# Patient Record
Sex: MALE | Race: WHITE | NOT HISPANIC OR LATINO
[De-identification: names, ages, dates, MRNs, and addresses within clinical notes are randomized per-mention and may not be internally consistent; named-entity substitution may affect disease eponyms.]

---

## 2017-09-27 PROBLEM — Z00.00 ENCOUNTER FOR PREVENTIVE HEALTH EXAMINATION: Status: ACTIVE | Noted: 2017-09-27

## 2017-10-14 PROBLEM — M25.569 KNEE PAIN: Status: ACTIVE | Noted: 2017-10-14

## 2017-10-18 ENCOUNTER — APPOINTMENT (OUTPATIENT)
Dept: ORTHOPEDIC SURGERY | Facility: CLINIC | Age: 47
End: 2017-10-18
Payer: COMMERCIAL

## 2017-10-18 VITALS
WEIGHT: 220 LBS | DIASTOLIC BLOOD PRESSURE: 89 MMHG | HEIGHT: 70 IN | TEMPERATURE: 98.1 F | BODY MASS INDEX: 31.5 KG/M2 | SYSTOLIC BLOOD PRESSURE: 135 MMHG | HEART RATE: 79 BPM

## 2017-10-18 DIAGNOSIS — M25.561 PAIN IN RIGHT KNEE: ICD-10-CM

## 2017-10-18 DIAGNOSIS — M25.569 PAIN IN UNSPECIFIED KNEE: ICD-10-CM

## 2017-10-18 DIAGNOSIS — Z86.79 PERSONAL HISTORY OF OTHER DISEASES OF THE CIRCULATORY SYSTEM: ICD-10-CM

## 2017-10-18 PROCEDURE — 73562 X-RAY EXAM OF KNEE 3: CPT | Mod: RT

## 2017-10-18 PROCEDURE — 99204 OFFICE O/P NEW MOD 45 MIN: CPT

## 2017-10-18 RX ORDER — NIFEDIPINE 10 MG/1
CAPSULE ORAL
Refills: 0 | Status: ACTIVE | COMMUNITY

## 2017-10-18 RX ORDER — ATENOLOL 50 MG/1
50 TABLET ORAL
Refills: 0 | Status: ACTIVE | COMMUNITY

## 2017-10-18 RX ORDER — LISINOPRIL 30 MG/1
TABLET ORAL
Refills: 0 | Status: ACTIVE | COMMUNITY

## 2017-10-18 RX ORDER — DOXAZOSIN 1 MG/1
1 TABLET ORAL
Refills: 0 | Status: ACTIVE | COMMUNITY

## 2017-11-27 ENCOUNTER — OUTPATIENT (OUTPATIENT)
Dept: OUTPATIENT SERVICES | Facility: HOSPITAL | Age: 47
LOS: 1 days | End: 2017-11-27
Payer: COMMERCIAL

## 2017-11-27 VITALS
RESPIRATION RATE: 16 BRPM | HEIGHT: 70 IN | WEIGHT: 244.49 LBS | TEMPERATURE: 99 F | DIASTOLIC BLOOD PRESSURE: 80 MMHG | HEART RATE: 75 BPM | SYSTOLIC BLOOD PRESSURE: 120 MMHG

## 2017-11-27 DIAGNOSIS — Z01.818 ENCOUNTER FOR OTHER PREPROCEDURAL EXAMINATION: ICD-10-CM

## 2017-11-27 DIAGNOSIS — M17.11 UNILATERAL PRIMARY OSTEOARTHRITIS, RIGHT KNEE: ICD-10-CM

## 2017-11-27 DIAGNOSIS — I10 ESSENTIAL (PRIMARY) HYPERTENSION: ICD-10-CM

## 2017-11-27 DIAGNOSIS — Z98.890 OTHER SPECIFIED POSTPROCEDURAL STATES: Chronic | ICD-10-CM

## 2017-11-27 LAB
ANION GAP SERPL CALC-SCNC: 14 MMOL/L — SIGNIFICANT CHANGE UP (ref 5–17)
APTT BLD: 29.5 SEC — SIGNIFICANT CHANGE UP (ref 27.5–37.4)
BASOPHILS # BLD AUTO: 0.1 K/UL — SIGNIFICANT CHANGE UP (ref 0–0.2)
BASOPHILS NFR BLD AUTO: 1 % — SIGNIFICANT CHANGE UP (ref 0–2)
BLD GP AB SCN SERPL QL: SIGNIFICANT CHANGE UP
BUN SERPL-MCNC: 15 MG/DL — SIGNIFICANT CHANGE UP (ref 8–20)
CALCIUM SERPL-MCNC: 9.4 MG/DL — SIGNIFICANT CHANGE UP (ref 8.6–10.2)
CHLORIDE SERPL-SCNC: 103 MMOL/L — SIGNIFICANT CHANGE UP (ref 98–107)
CO2 SERPL-SCNC: 28 MMOL/L — SIGNIFICANT CHANGE UP (ref 22–29)
CREAT SERPL-MCNC: 0.99 MG/DL — SIGNIFICANT CHANGE UP (ref 0.5–1.3)
EOSINOPHIL # BLD AUTO: 0.2 K/UL — SIGNIFICANT CHANGE UP (ref 0–0.5)
EOSINOPHIL NFR BLD AUTO: 2.7 % — SIGNIFICANT CHANGE UP (ref 0–5)
GLUCOSE SERPL-MCNC: 118 MG/DL — HIGH (ref 70–115)
HCT VFR BLD CALC: 45.9 % — SIGNIFICANT CHANGE UP (ref 42–52)
HGB BLD-MCNC: 15.7 G/DL — SIGNIFICANT CHANGE UP (ref 14–18)
INR BLD: 0.98 RATIO — SIGNIFICANT CHANGE UP (ref 0.88–1.16)
LYMPHOCYTES # BLD AUTO: 1.2 K/UL — SIGNIFICANT CHANGE UP (ref 1–4.8)
LYMPHOCYTES # BLD AUTO: 19.2 % — LOW (ref 20–55)
MCHC RBC-ENTMCNC: 31 PG — SIGNIFICANT CHANGE UP (ref 27–31)
MCHC RBC-ENTMCNC: 34.2 G/DL — SIGNIFICANT CHANGE UP (ref 32–36)
MCV RBC AUTO: 90.7 FL — SIGNIFICANT CHANGE UP (ref 80–94)
MONOCYTES # BLD AUTO: 0.5 K/UL — SIGNIFICANT CHANGE UP (ref 0–0.8)
MONOCYTES NFR BLD AUTO: 8.6 % — SIGNIFICANT CHANGE UP (ref 3–10)
MRSA PCR RESULT.: SIGNIFICANT CHANGE UP
NEUTROPHILS # BLD AUTO: 4.3 K/UL — SIGNIFICANT CHANGE UP (ref 1.8–8)
NEUTROPHILS NFR BLD AUTO: 68 % — SIGNIFICANT CHANGE UP (ref 37–73)
PLATELET # BLD AUTO: 220 K/UL — SIGNIFICANT CHANGE UP (ref 150–400)
POTASSIUM SERPL-MCNC: 4.4 MMOL/L — SIGNIFICANT CHANGE UP (ref 3.5–5.3)
POTASSIUM SERPL-SCNC: 4.4 MMOL/L — SIGNIFICANT CHANGE UP (ref 3.5–5.3)
PROTHROM AB SERPL-ACNC: 10.8 SEC — SIGNIFICANT CHANGE UP (ref 9.8–12.7)
RBC # BLD: 5.06 M/UL — SIGNIFICANT CHANGE UP (ref 4.6–6.2)
RBC # FLD: 13.3 % — SIGNIFICANT CHANGE UP (ref 11–15.6)
S AUREUS DNA NOSE QL NAA+PROBE: SIGNIFICANT CHANGE UP
SODIUM SERPL-SCNC: 145 MMOL/L — SIGNIFICANT CHANGE UP (ref 135–145)
TYPE + AB SCN PNL BLD: SIGNIFICANT CHANGE UP
WBC # BLD: 6.3 K/UL — SIGNIFICANT CHANGE UP (ref 4.8–10.8)
WBC # FLD AUTO: 6.3 K/UL — SIGNIFICANT CHANGE UP (ref 4.8–10.8)

## 2017-11-27 PROCEDURE — 85027 COMPLETE CBC AUTOMATED: CPT

## 2017-11-27 PROCEDURE — 85610 PROTHROMBIN TIME: CPT

## 2017-11-27 PROCEDURE — 86850 RBC ANTIBODY SCREEN: CPT

## 2017-11-27 PROCEDURE — 86901 BLOOD TYPING SEROLOGIC RH(D): CPT

## 2017-11-27 PROCEDURE — 87641 MR-STAPH DNA AMP PROBE: CPT

## 2017-11-27 PROCEDURE — 87640 STAPH A DNA AMP PROBE: CPT

## 2017-11-27 PROCEDURE — G0463: CPT

## 2017-11-27 PROCEDURE — 93005 ELECTROCARDIOGRAM TRACING: CPT

## 2017-11-27 PROCEDURE — 85730 THROMBOPLASTIN TIME PARTIAL: CPT

## 2017-11-27 PROCEDURE — 36415 COLL VENOUS BLD VENIPUNCTURE: CPT

## 2017-11-27 PROCEDURE — 93010 ELECTROCARDIOGRAM REPORT: CPT

## 2017-11-27 PROCEDURE — 80048 BASIC METABOLIC PNL TOTAL CA: CPT

## 2017-11-27 PROCEDURE — 86900 BLOOD TYPING SEROLOGIC ABO: CPT

## 2017-11-27 NOTE — H&P PST ADULT - NSANTHOSAYNRD_GEN_A_CORE
No. JOSE L screening performed.  STOP BANG Legend: 0-2 = LOW Risk; 3-4 = INTERMEDIATE Risk; 5-8 = HIGH Risk

## 2017-11-27 NOTE — H&P PST ADULT - HISTORY OF PRESENT ILLNESS
47yom who states that he has right knee pain for the last few years which progressively worsened, had steroid injections to the knee multiple times which is no more helping

## 2017-12-09 RX ORDER — OXYCODONE HYDROCHLORIDE 5 MG/1
20 TABLET ORAL ONCE
Qty: 0 | Refills: 0 | Status: DISCONTINUED | OUTPATIENT
Start: 2017-12-18 | End: 2017-12-18

## 2017-12-17 ENCOUNTER — FORM ENCOUNTER (OUTPATIENT)
Age: 47
End: 2017-12-17

## 2017-12-18 ENCOUNTER — APPOINTMENT (OUTPATIENT)
Dept: ORTHOPEDIC SURGERY | Facility: HOSPITAL | Age: 47
End: 2017-12-18

## 2017-12-18 ENCOUNTER — INPATIENT (INPATIENT)
Facility: HOSPITAL | Age: 47
LOS: 1 days | Discharge: HOME | DRG: 470 | End: 2017-12-20
Attending: ORTHOPAEDIC SURGERY | Admitting: ORTHOPAEDIC SURGERY
Payer: COMMERCIAL

## 2017-12-18 ENCOUNTER — TRANSCRIPTION ENCOUNTER (OUTPATIENT)
Age: 47
End: 2017-12-18

## 2017-12-18 ENCOUNTER — RESULT REVIEW (OUTPATIENT)
Age: 47
End: 2017-12-18

## 2017-12-18 VITALS
WEIGHT: 242.51 LBS | TEMPERATURE: 99 F | HEART RATE: 68 BPM | RESPIRATION RATE: 16 BRPM | OXYGEN SATURATION: 99 % | DIASTOLIC BLOOD PRESSURE: 76 MMHG | HEIGHT: 70 IN | SYSTOLIC BLOOD PRESSURE: 126 MMHG

## 2017-12-18 DIAGNOSIS — M17.11 UNILATERAL PRIMARY OSTEOARTHRITIS, RIGHT KNEE: ICD-10-CM

## 2017-12-18 DIAGNOSIS — Z29.9 ENCOUNTER FOR PROPHYLACTIC MEASURES, UNSPECIFIED: ICD-10-CM

## 2017-12-18 DIAGNOSIS — Z98.890 OTHER SPECIFIED POSTPROCEDURAL STATES: Chronic | ICD-10-CM

## 2017-12-18 DIAGNOSIS — Z00.00 ENCOUNTER FOR GENERAL ADULT MEDICAL EXAMINATION WITHOUT ABNORMAL FINDINGS: ICD-10-CM

## 2017-12-18 DIAGNOSIS — I10 ESSENTIAL (PRIMARY) HYPERTENSION: ICD-10-CM

## 2017-12-18 DIAGNOSIS — Z96.651 PRESENCE OF RIGHT ARTIFICIAL KNEE JOINT: ICD-10-CM

## 2017-12-18 PROCEDURE — 88311 DECALCIFY TISSUE: CPT | Mod: 26

## 2017-12-18 PROCEDURE — 20985 CPTR-ASST DIR MS PX: CPT

## 2017-12-18 PROCEDURE — 73560 X-RAY EXAM OF KNEE 1 OR 2: CPT | Mod: 26,RT

## 2017-12-18 PROCEDURE — 99253 IP/OBS CNSLTJ NEW/EST LOW 45: CPT

## 2017-12-18 PROCEDURE — 88305 TISSUE EXAM BY PATHOLOGIST: CPT | Mod: 26

## 2017-12-18 PROCEDURE — 27447 TOTAL KNEE ARTHROPLASTY: CPT | Mod: RT

## 2017-12-18 PROCEDURE — 27447 TOTAL KNEE ARTHROPLASTY: CPT | Mod: AS,RT

## 2017-12-18 RX ORDER — SODIUM CHLORIDE 9 MG/ML
1000 INJECTION, SOLUTION INTRAVENOUS
Qty: 0 | Refills: 0 | Status: DISCONTINUED | OUTPATIENT
Start: 2017-12-18 | End: 2017-12-18

## 2017-12-18 RX ORDER — VANCOMYCIN HCL 1 G
1750 VIAL (EA) INTRAVENOUS
Qty: 0 | Refills: 0 | Status: COMPLETED | OUTPATIENT
Start: 2017-12-18 | End: 2017-12-18

## 2017-12-18 RX ORDER — CELECOXIB 200 MG/1
400 CAPSULE ORAL ONCE
Qty: 0 | Refills: 0 | Status: COMPLETED | OUTPATIENT
Start: 2017-12-18 | End: 2017-12-18

## 2017-12-18 RX ORDER — SODIUM CHLORIDE 9 MG/ML
1000 INJECTION, SOLUTION INTRAVENOUS
Qty: 0 | Refills: 0 | Status: DISCONTINUED | OUTPATIENT
Start: 2017-12-18 | End: 2017-12-19

## 2017-12-18 RX ORDER — ATENOLOL 25 MG/1
100 TABLET ORAL DAILY
Qty: 0 | Refills: 0 | Status: DISCONTINUED | OUTPATIENT
Start: 2017-12-18 | End: 2017-12-20

## 2017-12-18 RX ORDER — HYDROMORPHONE HYDROCHLORIDE 2 MG/ML
0.5 INJECTION INTRAMUSCULAR; INTRAVENOUS; SUBCUTANEOUS
Qty: 0 | Refills: 0 | Status: DISCONTINUED | OUTPATIENT
Start: 2017-12-18 | End: 2017-12-18

## 2017-12-18 RX ORDER — OXYCODONE HYDROCHLORIDE 5 MG/1
10 TABLET ORAL
Qty: 0 | Refills: 0 | Status: DISCONTINUED | OUTPATIENT
Start: 2017-12-18 | End: 2017-12-20

## 2017-12-18 RX ORDER — CEFAZOLIN SODIUM 1 G
2000 VIAL (EA) INJECTION
Qty: 0 | Refills: 0 | Status: COMPLETED | OUTPATIENT
Start: 2017-12-18 | End: 2017-12-19

## 2017-12-18 RX ORDER — FERROUS SULFATE 325(65) MG
325 TABLET ORAL DAILY
Qty: 0 | Refills: 0 | Status: DISCONTINUED | OUTPATIENT
Start: 2017-12-18 | End: 2017-12-20

## 2017-12-18 RX ORDER — TRANEXAMIC ACID 100 MG/ML
1100 INJECTION, SOLUTION INTRAVENOUS ONCE
Qty: 0 | Refills: 0 | Status: DISCONTINUED | OUTPATIENT
Start: 2017-12-18 | End: 2017-12-18

## 2017-12-18 RX ORDER — DOXAZOSIN MESYLATE 4 MG
4 TABLET ORAL DAILY
Qty: 0 | Refills: 0 | Status: DISCONTINUED | OUTPATIENT
Start: 2017-12-18 | End: 2017-12-20

## 2017-12-18 RX ORDER — ONDANSETRON 8 MG/1
4 TABLET, FILM COATED ORAL ONCE
Qty: 0 | Refills: 0 | Status: DISCONTINUED | OUTPATIENT
Start: 2017-12-18 | End: 2017-12-18

## 2017-12-18 RX ORDER — CELECOXIB 200 MG/1
200 CAPSULE ORAL
Qty: 0 | Refills: 0 | Status: DISCONTINUED | OUTPATIENT
Start: 2017-12-20 | End: 2017-12-20

## 2017-12-18 RX ORDER — ACETAMINOPHEN 500 MG
650 TABLET ORAL EVERY 6 HOURS
Qty: 0 | Refills: 0 | Status: DISCONTINUED | OUTPATIENT
Start: 2017-12-18 | End: 2017-12-20

## 2017-12-18 RX ORDER — OXYCODONE HYDROCHLORIDE 5 MG/1
10 TABLET ORAL EVERY 12 HOURS
Qty: 0 | Refills: 0 | Status: DISCONTINUED | OUTPATIENT
Start: 2017-12-18 | End: 2017-12-20

## 2017-12-18 RX ORDER — LISINOPRIL 2.5 MG/1
10 TABLET ORAL DAILY
Qty: 0 | Refills: 0 | Status: DISCONTINUED | OUTPATIENT
Start: 2017-12-18 | End: 2017-12-20

## 2017-12-18 RX ORDER — SENNA PLUS 8.6 MG/1
2 TABLET ORAL AT BEDTIME
Qty: 0 | Refills: 0 | Status: DISCONTINUED | OUTPATIENT
Start: 2017-12-18 | End: 2017-12-20

## 2017-12-18 RX ORDER — ONDANSETRON 8 MG/1
4 TABLET, FILM COATED ORAL EVERY 6 HOURS
Qty: 0 | Refills: 0 | Status: DISCONTINUED | OUTPATIENT
Start: 2017-12-18 | End: 2017-12-20

## 2017-12-18 RX ORDER — HYDROMORPHONE HYDROCHLORIDE 2 MG/ML
0.5 INJECTION INTRAMUSCULAR; INTRAVENOUS; SUBCUTANEOUS EVERY 4 HOURS
Qty: 0 | Refills: 0 | Status: DISCONTINUED | OUTPATIENT
Start: 2017-12-18 | End: 2017-12-20

## 2017-12-18 RX ORDER — DOCUSATE SODIUM 100 MG
100 CAPSULE ORAL THREE TIMES A DAY
Qty: 0 | Refills: 0 | Status: DISCONTINUED | OUTPATIENT
Start: 2017-12-18 | End: 2017-12-20

## 2017-12-18 RX ORDER — FERROUS SULFATE 325(65) MG
325 TABLET ORAL
Qty: 0 | Refills: 0 | Status: DISCONTINUED | OUTPATIENT
Start: 2017-12-18 | End: 2017-12-18

## 2017-12-18 RX ORDER — SODIUM CHLORIDE 9 MG/ML
3 INJECTION INTRAMUSCULAR; INTRAVENOUS; SUBCUTANEOUS EVERY 8 HOURS
Qty: 0 | Refills: 0 | Status: DISCONTINUED | OUTPATIENT
Start: 2017-12-18 | End: 2017-12-18

## 2017-12-18 RX ORDER — VANCOMYCIN HCL 1 G
1750 VIAL (EA) INTRAVENOUS ONCE
Qty: 0 | Refills: 0 | Status: COMPLETED | OUTPATIENT
Start: 2017-12-18 | End: 2017-12-18

## 2017-12-18 RX ORDER — MAGNESIUM HYDROXIDE 400 MG/1
30 TABLET, CHEWABLE ORAL DAILY
Qty: 0 | Refills: 0 | Status: DISCONTINUED | OUTPATIENT
Start: 2017-12-18 | End: 2017-12-20

## 2017-12-18 RX ORDER — ACETAMINOPHEN 500 MG
1000 TABLET ORAL ONCE
Qty: 0 | Refills: 0 | Status: DISCONTINUED | OUTPATIENT
Start: 2017-12-18 | End: 2017-12-18

## 2017-12-18 RX ORDER — OXYCODONE HYDROCHLORIDE 5 MG/1
5 TABLET ORAL
Qty: 0 | Refills: 0 | Status: DISCONTINUED | OUTPATIENT
Start: 2017-12-18 | End: 2017-12-20

## 2017-12-18 RX ORDER — HYDROMORPHONE HYDROCHLORIDE 2 MG/ML
0.5 INJECTION INTRAMUSCULAR; INTRAVENOUS; SUBCUTANEOUS
Qty: 0 | Refills: 0 | Status: DISCONTINUED | OUTPATIENT
Start: 2017-12-18 | End: 2017-12-19

## 2017-12-18 RX ORDER — ACETAMINOPHEN 500 MG
975 TABLET ORAL EVERY 8 HOURS
Qty: 0 | Refills: 0 | Status: DISCONTINUED | OUTPATIENT
Start: 2017-12-18 | End: 2017-12-20

## 2017-12-18 RX ORDER — LISINOPRIL 2.5 MG/1
10 TABLET ORAL DAILY
Qty: 0 | Refills: 0 | Status: DISCONTINUED | OUTPATIENT
Start: 2017-12-18 | End: 2017-12-18

## 2017-12-18 RX ORDER — NIFEDIPINE 30 MG
90 TABLET, EXTENDED RELEASE 24 HR ORAL DAILY
Qty: 0 | Refills: 0 | Status: DISCONTINUED | OUTPATIENT
Start: 2017-12-18 | End: 2017-12-20

## 2017-12-18 RX ORDER — CEFAZOLIN SODIUM 1 G
2000 VIAL (EA) INJECTION ONCE
Qty: 0 | Refills: 0 | Status: DISCONTINUED | OUTPATIENT
Start: 2017-12-18 | End: 2017-12-18

## 2017-12-18 RX ORDER — BUPIVACAINE 13.3 MG/ML
20 INJECTION, SUSPENSION, LIPOSOMAL INFILTRATION ONCE
Qty: 0 | Refills: 0 | Status: DISCONTINUED | OUTPATIENT
Start: 2017-12-18 | End: 2017-12-18

## 2017-12-18 RX ORDER — GABAPENTIN 400 MG/1
600 CAPSULE ORAL ONCE
Qty: 0 | Refills: 0 | Status: COMPLETED | OUTPATIENT
Start: 2017-12-18 | End: 2017-12-18

## 2017-12-18 RX ORDER — FOLIC ACID 0.8 MG
1 TABLET ORAL DAILY
Qty: 0 | Refills: 0 | Status: DISCONTINUED | OUTPATIENT
Start: 2017-12-18 | End: 2017-12-20

## 2017-12-18 RX ORDER — ONDANSETRON 8 MG/1
4 TABLET, FILM COATED ORAL ONCE
Qty: 0 | Refills: 0 | Status: DISCONTINUED | OUTPATIENT
Start: 2017-12-18 | End: 2017-12-20

## 2017-12-18 RX ORDER — ASPIRIN/CALCIUM CARB/MAGNESIUM 324 MG
325 TABLET ORAL
Qty: 0 | Refills: 0 | Status: DISCONTINUED | OUTPATIENT
Start: 2017-12-19 | End: 2017-12-20

## 2017-12-18 RX ADMIN — Medication 250 MILLIGRAM(S): at 20:45

## 2017-12-18 RX ADMIN — OXYCODONE HYDROCHLORIDE 20 MILLIGRAM(S): 5 TABLET ORAL at 12:17

## 2017-12-18 RX ADMIN — CELECOXIB 400 MILLIGRAM(S): 200 CAPSULE ORAL at 09:28

## 2017-12-18 RX ADMIN — Medication 100 MILLIGRAM(S): at 18:11

## 2017-12-18 RX ADMIN — Medication 975 MILLIGRAM(S): at 15:57

## 2017-12-18 RX ADMIN — Medication 100 MILLIGRAM(S): at 20:45

## 2017-12-18 RX ADMIN — OXYCODONE HYDROCHLORIDE 10 MILLIGRAM(S): 5 TABLET ORAL at 20:51

## 2017-12-18 RX ADMIN — GABAPENTIN 600 MILLIGRAM(S): 400 CAPSULE ORAL at 09:30

## 2017-12-18 RX ADMIN — Medication 975 MILLIGRAM(S): at 20:45

## 2017-12-18 RX ADMIN — Medication 975 MILLIGRAM(S): at 14:50

## 2017-12-18 RX ADMIN — Medication 250 MILLIGRAM(S): at 09:30

## 2017-12-18 RX ADMIN — OXYCODONE HYDROCHLORIDE 20 MILLIGRAM(S): 5 TABLET ORAL at 09:29

## 2017-12-18 RX ADMIN — CELECOXIB 400 MILLIGRAM(S): 200 CAPSULE ORAL at 12:17

## 2017-12-18 RX ADMIN — Medication 975 MILLIGRAM(S): at 21:25

## 2017-12-18 RX ADMIN — Medication 100 MILLIGRAM(S): at 14:51

## 2017-12-18 RX ADMIN — OXYCODONE HYDROCHLORIDE 10 MILLIGRAM(S): 5 TABLET ORAL at 18:11

## 2017-12-18 NOTE — CONSULT NOTE ADULT - ASSESSMENT
47yom who states that he has right knee pain for the last few years which progressively worsened, had steroid injections to the knee multiple times which is no more helping, now s/p R TKA , POD # 0

## 2017-12-18 NOTE — CONSULT NOTE ADULT - PROBLEM SELECTOR RECOMMENDATION 5
Thank you for the courtesy of this consult . Will follow . Thank you for the courtesy of this consult . Will follow .    Presumed JOSE L - continue  and O2 as needed

## 2017-12-18 NOTE — DISCHARGE NOTE ADULT - HOSPITAL COURSE
The patient underwent a Right TOTAL KNEE REPLACEMENT on 12/18/17. The patient received antibiotics consistent with SCIP guidelines. The patient underwent the procedure and had no intra-operative complications. Post-operatively, the patient was seen by medicine and PT. The patient received ASA for DVTP. The patient received pain medications per orthopedic pain managment protocol and the pain was appropriately controlled. The patient did not have any post-operative medical complications. The patient was discharged in stable condition.

## 2017-12-18 NOTE — DISCHARGE NOTE ADULT - CARE PROVIDER_API CALL
Jerry Espinosa), Orthopaedic Surgery  [address] [place] [address]  Phone: [phone]  Fax: [phone]

## 2017-12-18 NOTE — PHYSICAL THERAPY INITIAL EVALUATION ADULT - ADDITIONAL COMMENTS
per patient he owns all the DME needed for home. He has a single step to enter without a handrail but is concerned with the 3 SIMON his mother in laws home for [place]. Pt's wife will be able to assist as needed when home.

## 2017-12-18 NOTE — DISCHARGE NOTE ADULT - CARE PLAN
Goal:	PT  Instructions for follow-up, activity and diet:	The patient will be seen in the office in 3 weeks for wound check. Tape will be removed at that time. Patient may shower after post-op day #5. The dressing is to be removed on 12/26/17. IF THE DRESSING BECOMES SOILED BEFORE THE REMOVAL DATE, CHANGE WITH A SIMILAR DRESSING. IF THE DRESSING BECOMES STAINED WITH DISCHARGE, CONTACT THE OFFICE FOR FURTHER DIRECTIONS. The patient will contact the office if the wound becomes red, has increasing pain, develops bleeding or discharge, an injury occurs, or has other concerns. The patient will continue PT consistent with total knee replacement. The patient will continue aspirin 325mg twice daily for 6 weeks for blood clot prevention. The patient will take OXYCODONE AND TYLENOL for pain control and titrate according to prescription and patient needs. The patient will take Colace while taking oxycodone to prevent narcotic associated constipation.  Additionally, increase water intake (drink at least 8 glasses of water daily) and try adding fiber to the diet by eating fruits, vegetables and foods that are rich in grains. If constipation is experienced, contact the medical/primary care provider to discuss further treatment options. The patient is FULL weight bearing. Elevation of the lower leg is recommended to reduce swelling. Principal Discharge DX:	Primary osteoarthritis of right knee  Goal:	Improve quality of life, improve mobility, improve pain, return to functional actives as directed  Instructions for follow-up, activity and diet:	The patient will be seen in the office in 3 weeks for wound check. Tape will be removed at that time. Patient may shower after post-op day #5. The dressing is to be removed on 12/26/17. IF THE DRESSING BECOMES SOILED BEFORE THE REMOVAL DATE, CHANGE WITH A SIMILAR DRESSING. IF THE DRESSING BECOMES STAINED WITH DISCHARGE, CONTACT THE OFFICE FOR FURTHER DIRECTIONS. The patient will contact the office if the wound becomes red, has increasing pain, develops bleeding or discharge, an injury occurs, or has other concerns. The patient will continue PT consistent with total knee replacement. The patient will continue aspirin 325mg twice daily for 6 weeks for blood clot prevention. The patient will take OXYCODONE AND TYLENOL for pain control and titrate according to prescription and patient needs. The patient will take Colace while taking oxycodone to prevent narcotic associated constipation.  Additionally, increase water intake (drink at least 8 glasses of water daily) and try adding fiber to the diet by eating fruits, vegetables and foods that are rich in grains. If constipation is experienced, contact the medical/primary care provider to discuss further treatment options. The patient is FULL weight bearing. Elevation of the lower leg is recommended to reduce swelling.

## 2017-12-18 NOTE — DISCHARGE NOTE ADULT - PLAN OF CARE
PT The patient will be seen in the office in 3 weeks for wound check. Tape will be removed at that time. Patient may shower after post-op day #5. The dressing is to be removed on 12/26/17. IF THE DRESSING BECOMES SOILED BEFORE THE REMOVAL DATE, CHANGE WITH A SIMILAR DRESSING. IF THE DRESSING BECOMES STAINED WITH DISCHARGE, CONTACT THE OFFICE FOR FURTHER DIRECTIONS. The patient will contact the office if the wound becomes red, has increasing pain, develops bleeding or discharge, an injury occurs, or has other concerns. The patient will continue PT consistent with total knee replacement. The patient will continue aspirin 325mg twice daily for 6 weeks for blood clot prevention. The patient will take OXYCODONE AND TYLENOL for pain control and titrate according to prescription and patient needs. The patient will take Colace while taking oxycodone to prevent narcotic associated constipation.  Additionally, increase water intake (drink at least 8 glasses of water daily) and try adding fiber to the diet by eating fruits, vegetables and foods that are rich in grains. If constipation is experienced, contact the medical/primary care provider to discuss further treatment options. The patient is FULL weight bearing. Elevation of the lower leg is recommended to reduce swelling. Improve quality of life, improve mobility, improve pain, return to functional actives as directed

## 2017-12-18 NOTE — PROGRESS NOTE ADULT - SUBJECTIVE AND OBJECTIVE BOX
Orthopedic PA Postop Note  Patient S/P Right TKA  Patient in bed comfortable   Right Leg  Dressing C/D/I   Pulse intact   Calf Soft NT  Dorsi/Plantar Flexion intact     Vital Signs Last 24 Hrs  T(C): 36.4 (18 Dec 2017 16:12), Max: 37 (18 Dec 2017 09:14)  T(F): 97.5 (18 Dec 2017 16:12), Max: 98.6 (18 Dec 2017 09:14)  HR: 67 (18 Dec 2017 16:12) (63 - 74)  BP: 108/63 (18 Dec 2017 16:12) (99/83 - 142/81)  BP(mean): --  RR: 18 (18 Dec 2017 16:12) (11 - 18)  SpO2: 95% (18 Dec 2017 16:12) (95% - 100%)    < from: Xray Knee 1 or 2 Views, Right (12.18.17 @ 15:38) >   EXAM:  KNEE-RIGHT                          PROCEDURE DATE:  12/18/2017          INTERPRETATION:  Right knee    Portable AP and lateral views.    Postoperative evaluation. Status post right knee replacement.    The prosthetic components are in goodposition. The alignment is normal.   Gas and fluid is present in the suprapatellar recess.    Impression: Right total knee replacement in good position                NAREN MOROCHO M.D., ATTENDING RADIOLOGIST  This document has been electronically signed. Dec 18 2017  3:15PM    < end of copied text >      A/P S/P Right TKA  1. DVTP - ASA  2. PT   3. Pain Control

## 2017-12-18 NOTE — CONSULT NOTE ADULT - PROBLEM SELECTOR RECOMMENDATION 3
continue home meds with parameters Episode of lightheadedness with BP on lower side ,continue home meds with parameters , continue ivf

## 2017-12-18 NOTE — DISCHARGE NOTE ADULT - PATIENT PORTAL LINK FT
“You can access the [facility] Patient Portal, offered by [facility], by registering with the following website: [email or web address]”

## 2017-12-18 NOTE — CONSULT NOTE ADULT - SUBJECTIVE AND OBJECTIVE BOX
PMD : Spencer   Cardio :     Patient is a 47y old  Male who presents with a chief complaint of R knee pain , s/p R TKA , POD # 0 ,    CC: R knee pain     HPI : 47yom who states that he has right knee pain for the last few years which progressively worsened, had steroid injections to the knee multiple times which is no more helping, now s/p R TKA .      PAST MEDICAL & SURGICAL HISTORY:  Hypertension  S/P right knee arthroscopy: 2007       Social History:  · Marital Status	  · Occupation	[employer]  · Lives With	spouse     Substance Use History:  · Substance Use	never used     Alcohol Use History:  · Have you ever consumed alcohol	yes...  · Alcohol Type	beer  · Alcohol Frequency	2-4 times/mo  · Alcohol Amount	1-2 drinks  · Problems Related to Alcohol Use	no  · 1. Have you felt you ought to CUT down on your drinking?	no  · 2. Have people ANNOYED you by criticizing your drinking?	no  · 3. Have you ever felt bad or GUILTY about your drinking?	no  · 4. Have you ever needed an "EYE OPENER", a drink first thing in the morning to steady your nerves or get rid of a hangover?	no     Tobacco Usage:  · Tobacco Usage: Never smoker      FAMILY HISTORY:  No pertinent family history in first degree relatives      Allergies    No Known Allergies    Intolerances        HOME MEDICATIONS :     · 	Procardia XL 90 mg oral tablet, extended release: Last Dose Taken:  , 1 tab(s) orally once a day  · 	Zestril 10 mg oral tablet: Last Dose Taken:  , 1 tab(s) orally once a day  · 	Cardura XL 4 mg oral tablet, extended release: Last Dose Taken:  , 1 tab(s) orally once a day (in the morning)  · 	Tenormin 100 mg oral tablet: Last Dose Taken:  , 1 tab(s) orally once a day  · 	Vitamin D3 400 intl units oral capsule: Last Dose Taken:  , 1 cap(s) orally once a day  · 	Vitamin B-12: Last Dose Taken:  , orally once a day        REVIEW OF SYSTEMS:    CONSTITUTIONAL: No fever, weight loss, or fatigue  EYES: No eye pain, visual disturbances, or discharge  NECK: No pain or stiffness  RESPIRATORY: No cough, wheezing, chills or hemoptysis; No shortness of breath  CARDIOVASCULAR: No chest pain, palpitations, dizziness, or leg swelling  GASTROINTESTINAL: No abdominal or epigastric pain. No nausea, vomiting, or hematemesis; No diarrhea or constipation. No melena or hematochezia.  GENITOURINARY: No dysuria, frequency, hematuria, or incontinence  NEUROLOGICAL: No headaches, memory loss, loss of strength, numbness, or tremors  SKIN: No itching, burning, rashes, or lesions   LYMPH NODES: No enlarged glands  ENDOCRINE: No heat or cold intolerance; No hair loss  MUSCULOSKELETAL: R knee pain   PSYCHIATRIC: No depression, anxiety, mood swings, or difficulty sleeping  HEME/LYMPH: No easy bruising, or bleeding gums  ALLERGY AND IMMUNOLOGIC: No hives or eczema    MEDICATIONS  (STANDING):  acetaminophen   Tablet. 975 milliGRAM(s) Oral every 8 hours  ATENolol  Tablet 100 milliGRAM(s) Oral daily  ceFAZolin   IVPB 2000 milliGRAM(s) IV Intermittent <User Schedule>  docusate sodium 100 milliGRAM(s) Oral three times a day  doxazosin 4 milliGRAM(s) Oral daily  ferrous    sulfate 325 milliGRAM(s) Oral three times a day with meals  folic acid 1 milliGRAM(s) Oral daily  lactated ringers. 1000 milliLiter(s) (100 mL/Hr) IV Continuous <Continuous>  lactated ringers. 1000 milliLiter(s) (100 mL/Hr) IV Continuous <Continuous>  lisinopril 10 milliGRAM(s) Oral daily  multivitamin 1 Tablet(s) Oral daily  NIFEdipine XL 90 milliGRAM(s) Oral daily  oxyCODONE  ER Tablet 10 milliGRAM(s) Oral every 12 hours  vancomycin  IVPB 1750 milliGRAM(s) IV Intermittent <User Schedule>    MEDICATIONS  (PRN):  acetaminophen   Tablet 650 milliGRAM(s) Oral every 6 hours PRN For Temp over 38.3 C (100.94 F)  aluminum hydroxide/magnesium hydroxide/simethicone Suspension 30 milliLiter(s) Oral four times a day PRN Indigestion  HYDROmorphone  Injectable 0.5 milliGRAM(s) IV Push every 4 hours PRN Severe Pain/breakthrough pain  HYDROmorphone  Injectable 0.5 milliGRAM(s) IV Push every 10 minutes PRN Moderate Pain (4 - 6)  magnesium hydroxide Suspension 30 milliLiter(s) Oral daily PRN Constipation  ondansetron Injectable 4 milliGRAM(s) IV Push every 6 hours PRN Nausea and/or Vomiting  ondansetron Injectable 4 milliGRAM(s) IV Push once PRN Nausea and/or Vomiting  oxyCODONE    IR 5 milliGRAM(s) Oral every 3 hours PRN Mild Pain  oxyCODONE    IR 10 milliGRAM(s) Oral every 3 hours PRN Moderate Pain  senna 2 Tablet(s) Oral at bedtime PRN Constipation      Vital Signs Last 24 Hrs  T(C): 36.1 (18 Dec 2017 12:05), Max: 37 (18 Dec 2017 09:14)  T(F): 97 (18 Dec 2017 12:05), Max: 98.6 (18 Dec 2017 09:14)  HR: 65 (18 Dec 2017 13:20) (63 - 74)  BP: 142/81 (18 Dec 2017 13:20) (99/83 - 142/81)  BP(mean): --  RR: 11 (18 Dec 2017 13:20) (11 - 17)  SpO2: 98% (18 Dec 2017 13:20) (97% - 100%)    PHYSICAL EXAM:    GENERAL: NAD, well-groomed, well-developed  HEAD:  Atraumatic, Normocephalic  EYES: EOMI, PERRLA, conjunctiva and sclera clear  NECK: Supple, No JVD, Normal thyroid  NERVOUS SYSTEM:  Alert & Oriented X3, Good concentration; Motor Strength 5/5 B/L upper and lower extremities; DTRs 2+ intact and symmetric  CHEST/LUNG: CTA  b/l,  no rales, rhonchi, wheezing, or rubs  HEART: Regular rate and rhythm; No murmurs, rubs, or gallops  ABDOMEN: Soft, Nontender, Nondistended; Bowel sounds present  EXTREMITIES:  2+ Peripheral Pulses, No clubbing, cyanosis, or edema , R knee dressing + , clean and dry   LYMPH: No lymphadenopathy noted  SKIN: No rashes or lesions    LABS: Pending         RADIOLOGY & ADDITIONAL STUDIES: PMD : Spencer   Cardio :     Patient is a 47y old  Male who presents with a chief complaint of R knee pain , s/p R TKA , POD # 0 ,    CC: R knee pain     HPI : 47yom who states that he has right knee pain for about 10 years which progressively worsened, had steroid injections to the knee multiple times which is no more helping, was taking Tylenol / Motrin but pain was getting  now s/p R TKA .      PAST MEDICAL & SURGICAL HISTORY:  Hypertension  S/P right knee arthroscopy: 2007       Social History:  · Marital Status	  · Occupation	[employer]  · Lives With	spouse     Substance Use History:  · Substance Use	never used     Alcohol Use History:  · Have you ever consumed alcohol	yes...  · Alcohol Type	beer  · Alcohol Frequency	2-4 times/mo  · Alcohol Amount	1-2 drinks  · Problems Related to Alcohol Use	no  · 1. Have you felt you ought to CUT down on your drinking?	no  · 2. Have people ANNOYED you by criticizing your drinking?	no  · 3. Have you ever felt bad or GUILTY about your drinking?	no  · 4. Have you ever needed an "EYE OPENER", a drink first thing in the morning to steady your nerves or get rid of a hangover?	no     Tobacco Usage:  · Tobacco Usage: Never smoker      FAMILY HISTORY:  No pertinent family history in first degree relatives      Allergies    No Known Allergies    Intolerances        HOME MEDICATIONS :     · 	Procardia XL 90 mg oral tablet, extended release: Last Dose Taken:  , 1 tab(s) orally once a day  · 	Zestril 10 mg oral tablet: Last Dose Taken:  , 1 tab(s) orally once a day  · 	Cardura XL 4 mg oral tablet, extended release: Last Dose Taken:  , 1 tab(s) orally once a day (in the morning)  · 	Tenormin 100 mg oral tablet: Last Dose Taken:  , 1 tab(s) orally once a day  · 	Vitamin D3 400 intl units oral capsule: Last Dose Taken:  , 1 cap(s) orally once a day  · 	Vitamin B-12: Last Dose Taken:  , orally once a day        REVIEW OF SYSTEMS:    CONSTITUTIONAL: No fever, weight loss, or fatigue  EYES: No eye pain, visual disturbances, or discharge  NECK: No pain or stiffness  RESPIRATORY: No cough, wheezing, chills or hemoptysis; No shortness of breath  CARDIOVASCULAR: No chest pain, palpitations, dizziness, or leg swelling  GASTROINTESTINAL: No abdominal or epigastric pain. No nausea, vomiting, or hematemesis; No diarrhea or constipation. No melena or hematochezia.  GENITOURINARY: No dysuria, frequency, hematuria, or incontinence  NEUROLOGICAL: No headaches, memory loss, loss of strength, numbness, or tremors  SKIN: No itching, burning, rashes, or lesions   LYMPH NODES: No enlarged glands  ENDOCRINE: No heat or cold intolerance; No hair loss  MUSCULOSKELETAL: R knee pain   PSYCHIATRIC: No depression, anxiety, mood swings, or difficulty sleeping  HEME/LYMPH: No easy bruising, or bleeding gums  ALLERGY AND IMMUNOLOGIC: No hives or eczema    MEDICATIONS  (STANDING):  acetaminophen   Tablet. 975 milliGRAM(s) Oral every 8 hours  ATENolol  Tablet 100 milliGRAM(s) Oral daily  ceFAZolin   IVPB 2000 milliGRAM(s) IV Intermittent <User Schedule>  docusate sodium 100 milliGRAM(s) Oral three times a day  doxazosin 4 milliGRAM(s) Oral daily  ferrous    sulfate 325 milliGRAM(s) Oral three times a day with meals  folic acid 1 milliGRAM(s) Oral daily  lactated ringers. 1000 milliLiter(s) (100 mL/Hr) IV Continuous <Continuous>  lactated ringers. 1000 milliLiter(s) (100 mL/Hr) IV Continuous <Continuous>  lisinopril 10 milliGRAM(s) Oral daily  multivitamin 1 Tablet(s) Oral daily  NIFEdipine XL 90 milliGRAM(s) Oral daily  oxyCODONE  ER Tablet 10 milliGRAM(s) Oral every 12 hours  vancomycin  IVPB 1750 milliGRAM(s) IV Intermittent <User Schedule>    MEDICATIONS  (PRN):  acetaminophen   Tablet 650 milliGRAM(s) Oral every 6 hours PRN For Temp over 38.3 C (100.94 F)  aluminum hydroxide/magnesium hydroxide/simethicone Suspension 30 milliLiter(s) Oral four times a day PRN Indigestion  HYDROmorphone  Injectable 0.5 milliGRAM(s) IV Push every 4 hours PRN Severe Pain/breakthrough pain  HYDROmorphone  Injectable 0.5 milliGRAM(s) IV Push every 10 minutes PRN Moderate Pain (4 - 6)  magnesium hydroxide Suspension 30 milliLiter(s) Oral daily PRN Constipation  ondansetron Injectable 4 milliGRAM(s) IV Push every 6 hours PRN Nausea and/or Vomiting  ondansetron Injectable 4 milliGRAM(s) IV Push once PRN Nausea and/or Vomiting  oxyCODONE    IR 5 milliGRAM(s) Oral every 3 hours PRN Mild Pain  oxyCODONE    IR 10 milliGRAM(s) Oral every 3 hours PRN Moderate Pain  senna 2 Tablet(s) Oral at bedtime PRN Constipation      Vital Signs Last 24 Hrs  T(C): 36.1 (18 Dec 2017 12:05), Max: 37 (18 Dec 2017 09:14)  T(F): 97 (18 Dec 2017 12:05), Max: 98.6 (18 Dec 2017 09:14)  HR: 65 (18 Dec 2017 13:20) (63 - 74)  BP: 142/81 (18 Dec 2017 13:20) (99/83 - 142/81)  BP(mean): --  RR: 11 (18 Dec 2017 13:20) (11 - 17)  SpO2: 98% (18 Dec 2017 13:20) (97% - 100%)    PHYSICAL EXAM:    GENERAL: NAD, well-groomed, well-developed  HEAD:  Atraumatic, Normocephalic  EYES: EOMI, PERRLA, conjunctiva and sclera clear  NECK: Supple, No JVD, Normal thyroid  NERVOUS SYSTEM:  Alert & Oriented X3, Good concentration; Motor Strength 5/5 B/L upper and lower extremities; DTRs 2+ intact and symmetric  CHEST/LUNG: CTA  b/l,  no rales, rhonchi, wheezing, or rubs  HEART: Regular rate and rhythm; No murmurs, rubs, or gallops  ABDOMEN: Soft, Nontender, Nondistended; Bowel sounds present  EXTREMITIES:  2+ Peripheral Pulses, No clubbing, cyanosis, or edema , R knee dressing + , clean and dry   LYMPH: No lymphadenopathy noted  SKIN: No rashes or lesions    LABS: Pending         RADIOLOGY & ADDITIONAL STUDIES: PMD : Spencer   Cardio :     Patient is a 47y old  Male who presents with a chief complaint of R knee pain , s/p R TKA , POD # 0 , Seen on medical floor , sitting on the bed , c/o mild lightheadedness , no other complaints , as per nurse - while asleep O2 sat drop to 88 %     CC: R knee pain , lightheadedness      HPI : 47yom who states that he has right knee pain for about 10 years which progressively worsened, had steroid injections to the knee multiple times which is no more helping, was taking Tylenol / Motrin but pain was getting  now s/p R TKA .      PAST MEDICAL & SURGICAL HISTORY:  Hypertension,  presumed JOSE L - needs sleep study   S/P right knee arthroscopy: 2007       Social History:  · Marital Status	  · Occupation	[employer]  · Lives With	spouse     Substance Use History:  · Substance Use	never used     Alcohol Use History:  · Have you ever consumed alcohol	yes...  · Alcohol Type	beer  · Alcohol Frequency	2-4 times/mo  · Alcohol Amount	1-2 drinks  · Problems Related to Alcohol Use	no  · 1. Have you felt you ought to CUT down on your drinking?	no  · 2. Have people ANNOYED you by criticizing your drinking?	no  · 3. Have you ever felt bad or GUILTY about your drinking?	no  · 4. Have you ever needed an "EYE OPENER", a drink first thing in the morning to steady your nerves or get rid of a hangover?	no     Tobacco Usage:  · Tobacco Usage: Never smoker      FAMILY HISTORY:  No pertinent family history in first degree relatives      Allergies    No Known Allergies    Intolerances        HOME MEDICATIONS :     · 	Procardia XL 90 mg oral tablet, extended release: Last Dose Taken:  , 1 tab(s) orally once a day  · 	Zestril 10 mg oral tablet: Last Dose Taken:  , 1 tab(s) orally once a day  · 	Cardura XL 4 mg oral tablet, extended release: Last Dose Taken:  , 1 tab(s) orally once a day (in the morning)  · 	Tenormin 100 mg oral tablet: Last Dose Taken:  , 1 tab(s) orally once a day  · 	Vitamin D3 400 intl units oral capsule: Last Dose Taken:  , 1 cap(s) orally once a day  · 	Vitamin B-12: Last Dose Taken:  , orally once a day        REVIEW OF SYSTEMS:    CONSTITUTIONAL: No fever, weight loss, or fatigue  EYES: No eye pain, visual disturbances, or discharge  NECK: No pain or stiffness  RESPIRATORY: No cough, wheezing, chills or hemoptysis; No shortness of breath  CARDIOVASCULAR: No chest pain, palpitations, dizziness, or leg swelling  GASTROINTESTINAL: No abdominal or epigastric pain. No nausea, vomiting, or hematemesis; No diarrhea or constipation. No melena or hematochezia.  GENITOURINARY: No dysuria, frequency, hematuria, or incontinence  NEUROLOGICAL: No headaches, memory loss, loss of strength, numbness, or tremors  SKIN: No itching, burning, rashes, or lesions   LYMPH NODES: No enlarged glands  ENDOCRINE: No heat or cold intolerance; No hair loss  MUSCULOSKELETAL: R knee pain   PSYCHIATRIC: No depression, anxiety, mood swings, or difficulty sleeping  HEME/LYMPH: No easy bruising, or bleeding gums  ALLERGY AND IMMUNOLOGIC: No hives or eczema    MEDICATIONS  (STANDING):  acetaminophen   Tablet. 975 milliGRAM(s) Oral every 8 hours  ATENolol  Tablet 100 milliGRAM(s) Oral daily  ceFAZolin   IVPB 2000 milliGRAM(s) IV Intermittent <User Schedule>  docusate sodium 100 milliGRAM(s) Oral three times a day  doxazosin 4 milliGRAM(s) Oral daily  ferrous    sulfate 325 milliGRAM(s) Oral three times a day with meals  folic acid 1 milliGRAM(s) Oral daily  lactated ringers. 1000 milliLiter(s) (100 mL/Hr) IV Continuous <Continuous>  lactated ringers. 1000 milliLiter(s) (100 mL/Hr) IV Continuous <Continuous>  lisinopril 10 milliGRAM(s) Oral daily  multivitamin 1 Tablet(s) Oral daily  NIFEdipine XL 90 milliGRAM(s) Oral daily  oxyCODONE  ER Tablet 10 milliGRAM(s) Oral every 12 hours  vancomycin  IVPB 1750 milliGRAM(s) IV Intermittent <User Schedule>    MEDICATIONS  (PRN):  acetaminophen   Tablet 650 milliGRAM(s) Oral every 6 hours PRN For Temp over 38.3 C (100.94 F)  aluminum hydroxide/magnesium hydroxide/simethicone Suspension 30 milliLiter(s) Oral four times a day PRN Indigestion  HYDROmorphone  Injectable 0.5 milliGRAM(s) IV Push every 4 hours PRN Severe Pain/breakthrough pain  HYDROmorphone  Injectable 0.5 milliGRAM(s) IV Push every 10 minutes PRN Moderate Pain (4 - 6)  magnesium hydroxide Suspension 30 milliLiter(s) Oral daily PRN Constipation  ondansetron Injectable 4 milliGRAM(s) IV Push every 6 hours PRN Nausea and/or Vomiting  ondansetron Injectable 4 milliGRAM(s) IV Push once PRN Nausea and/or Vomiting  oxyCODONE    IR 5 milliGRAM(s) Oral every 3 hours PRN Mild Pain  oxyCODONE    IR 10 milliGRAM(s) Oral every 3 hours PRN Moderate Pain  senna 2 Tablet(s) Oral at bedtime PRN Constipation      Vital Signs Last 24 Hrs  T(C): 36.1 (18 Dec 2017 12:05), Max: 37 (18 Dec 2017 09:14)  T(F): 97 (18 Dec 2017 12:05), Max: 98.6 (18 Dec 2017 09:14)  HR: 65 (18 Dec 2017 13:20) (63 - 74)  BP: 142/81 (18 Dec 2017 13:20) (99/83 - 142/81) , while exam  , HR - 63 , O 2 sat - 93%  BP(mean): --  RR: 11 (18 Dec 2017 13:20) (11 - 17)  SpO2: 98% (18 Dec 2017 13:20) (97% - 100%)    PHYSICAL EXAM:    GENERAL: NAD, well-groomed, well-developed  HEAD:  Atraumatic, Normocephalic  EYES: EOMI, PERRLA, conjunctiva and sclera clear  NECK: Supple, No JVD, Normal thyroid  NERVOUS SYSTEM:  Alert & Oriented X3, Good concentration; Motor Strength 5/5 B/L upper and lower extremities; DTRs 2+ intact and symmetric  CHEST/LUNG: CTA  b/l,  no rales, rhonchi, wheezing, or rubs  HEART: Regular rate and rhythm; No murmurs, rubs, or gallops  ABDOMEN: Soft, Nontender, Nondistended; Bowel sounds present  EXTREMITIES:  2+ Peripheral Pulses, No clubbing, cyanosis, or edema , R knee dressing + , clean and dry   LYMPH: No lymphadenopathy noted  SKIN: No rashes or lesions    LABS: Pending         RADIOLOGY & ADDITIONAL STUDIES:    < from: Xray Knee 1 or 2 Views, Right (12.18.17 @ 15:38) >     EXAM:  KNEE-RIGHT                          PROCEDURE DATE:  12/18/2017          INTERPRETATION:  Right knee    Portable AP and lateral views.    Postoperative evaluation. Status post right knee replacement.    The prosthetic components are in goodposition. The alignment is normal.   Gas and fluid is present in the suprapatellar recess.    Impression: Right total knee replacement in good position        NAREN MOROCHO M.D., ATTENDING RADIOLOGIST  This document has been electronically signed. Dec 18 2017  3:15PM        < end of copied text >

## 2017-12-18 NOTE — DISCHARGE NOTE ADULT - MEDICATION SUMMARY - MEDICATIONS TO TAKE
I will START or STAY ON the medications listed below when I get home from the hospital:    oxyCODONE 5 mg oral tablet  -- 1-2 tab(s) by mouth every 4 to 6 hours, As Needed - Pain MDD:7  -- Indication: For Pain    aspirin 325 mg oral delayed release tablet  -- 1 tab(s) by mouth 2 times a day  -- Indication: For DVT prophylaxis    Zestril 10 mg oral tablet  -- 1 tab(s) by mouth once a day  -- Indication: For home med    Cardura XL 4 mg oral tablet, extended release  -- 1 tab(s) by mouth once a day (in the morning)  -- Indication: For home med    Tenormin 100 mg oral tablet  -- 1 tab(s) by mouth once a day  -- Indication: For home med    Procardia XL 90 mg oral tablet, extended release  -- 1 tab(s) by mouth once a day  -- Indication: For home med    Senna S 50 mg-8.6 mg oral tablet  -- 2 tab(s) by mouth once a day (at bedtime)   -- Medication should be taken with plenty of water.    -- Indication: For constipation    Vitamin D3 400 intl units oral capsule  -- 1 cap(s) by mouth once a day  -- Indication: For home med    Vitamin B-12  -- orally once a day  -- Indication: For home med

## 2017-12-18 NOTE — PHYSICAL THERAPY INITIAL EVALUATION ADULT - PERTINENT HX OF CURRENT PROBLEM, REHAB EVAL
Pt presents to [facility] with reports of worsening right knee pain and difficulty ambulating

## 2017-12-18 NOTE — BRIEF OPERATIVE NOTE - PROCEDURE
<<-----Click on this checkbox to enter Procedure Total replacement of right knee joint  12/18/2017    Active  [id number]

## 2017-12-19 LAB
ANION GAP SERPL CALC-SCNC: 12 MMOL/L — SIGNIFICANT CHANGE UP (ref 5–17)
BUN SERPL-MCNC: 18 MG/DL — SIGNIFICANT CHANGE UP (ref 8–20)
CALCIUM SERPL-MCNC: 8.3 MG/DL — LOW (ref 8.6–10.2)
CHLORIDE SERPL-SCNC: 100 MMOL/L — SIGNIFICANT CHANGE UP (ref 98–107)
CO2 SERPL-SCNC: 26 MMOL/L — SIGNIFICANT CHANGE UP (ref 22–29)
CREAT SERPL-MCNC: 1.14 MG/DL — SIGNIFICANT CHANGE UP (ref 0.5–1.3)
GLUCOSE SERPL-MCNC: 128 MG/DL — HIGH (ref 70–115)
HCT VFR BLD CALC: 36.4 % — LOW (ref 42–52)
HGB BLD-MCNC: 11.9 G/DL — LOW (ref 14–18)
MCHC RBC-ENTMCNC: 30.4 PG — SIGNIFICANT CHANGE UP (ref 27–31)
MCHC RBC-ENTMCNC: 32.7 G/DL — SIGNIFICANT CHANGE UP (ref 32–36)
MCV RBC AUTO: 93.1 FL — SIGNIFICANT CHANGE UP (ref 80–94)
PLATELET # BLD AUTO: 180 K/UL — SIGNIFICANT CHANGE UP (ref 150–400)
POTASSIUM SERPL-MCNC: 4.4 MMOL/L — SIGNIFICANT CHANGE UP (ref 3.5–5.3)
POTASSIUM SERPL-SCNC: 4.4 MMOL/L — SIGNIFICANT CHANGE UP (ref 3.5–5.3)
RBC # BLD: 3.91 M/UL — LOW (ref 4.6–6.2)
RBC # FLD: 13 % — SIGNIFICANT CHANGE UP (ref 11–15.6)
SODIUM SERPL-SCNC: 138 MMOL/L — SIGNIFICANT CHANGE UP (ref 135–145)
WBC # BLD: 11.8 K/UL — HIGH (ref 4.8–10.8)
WBC # FLD AUTO: 11.8 K/UL — HIGH (ref 4.8–10.8)

## 2017-12-19 PROCEDURE — 99232 SBSQ HOSP IP/OBS MODERATE 35: CPT

## 2017-12-19 RX ORDER — SENNOSIDES/DOCUSATE SODIUM 8.6MG-50MG
2 TABLET ORAL
Qty: 20 | Refills: 0 | OUTPATIENT
Start: 2017-12-19 | End: 2017-12-28

## 2017-12-19 RX ORDER — OXYCODONE HYDROCHLORIDE 5 MG/1
1 TABLET ORAL
Qty: 50 | Refills: 0 | OUTPATIENT
Start: 2017-12-19

## 2017-12-19 RX ORDER — ASPIRIN/CALCIUM CARB/MAGNESIUM 324 MG
1 TABLET ORAL
Qty: 84 | Refills: 0 | OUTPATIENT
Start: 2017-12-19 | End: 2018-01-29

## 2017-12-19 RX ADMIN — Medication 325 MILLIGRAM(S): at 18:39

## 2017-12-19 RX ADMIN — SODIUM CHLORIDE 100 MILLILITER(S): 9 INJECTION, SOLUTION INTRAVENOUS at 07:49

## 2017-12-19 RX ADMIN — OXYCODONE HYDROCHLORIDE 10 MILLIGRAM(S): 5 TABLET ORAL at 02:12

## 2017-12-19 RX ADMIN — ATENOLOL 100 MILLIGRAM(S): 25 TABLET ORAL at 06:04

## 2017-12-19 RX ADMIN — SODIUM CHLORIDE 100 MILLILITER(S): 9 INJECTION, SOLUTION INTRAVENOUS at 07:50

## 2017-12-19 RX ADMIN — OXYCODONE HYDROCHLORIDE 10 MILLIGRAM(S): 5 TABLET ORAL at 02:55

## 2017-12-19 RX ADMIN — OXYCODONE HYDROCHLORIDE 10 MILLIGRAM(S): 5 TABLET ORAL at 18:39

## 2017-12-19 RX ADMIN — OXYCODONE HYDROCHLORIDE 10 MILLIGRAM(S): 5 TABLET ORAL at 03:25

## 2017-12-19 RX ADMIN — Medication 1 TABLET(S): at 12:44

## 2017-12-19 RX ADMIN — Medication 4 MILLIGRAM(S): at 06:05

## 2017-12-19 RX ADMIN — Medication 975 MILLIGRAM(S): at 06:06

## 2017-12-19 RX ADMIN — OXYCODONE HYDROCHLORIDE 10 MILLIGRAM(S): 5 TABLET ORAL at 15:49

## 2017-12-19 RX ADMIN — OXYCODONE HYDROCHLORIDE 10 MILLIGRAM(S): 5 TABLET ORAL at 09:55

## 2017-12-19 RX ADMIN — LISINOPRIL 10 MILLIGRAM(S): 2.5 TABLET ORAL at 06:04

## 2017-12-19 RX ADMIN — Medication 1 MILLIGRAM(S): at 12:47

## 2017-12-19 RX ADMIN — Medication 325 MILLIGRAM(S): at 12:44

## 2017-12-19 RX ADMIN — Medication 975 MILLIGRAM(S): at 12:44

## 2017-12-19 RX ADMIN — OXYCODONE HYDROCHLORIDE 10 MILLIGRAM(S): 5 TABLET ORAL at 21:59

## 2017-12-19 RX ADMIN — Medication 100 MILLIGRAM(S): at 02:06

## 2017-12-19 RX ADMIN — Medication 90 MILLIGRAM(S): at 06:04

## 2017-12-19 RX ADMIN — Medication 325 MILLIGRAM(S): at 06:04

## 2017-12-19 RX ADMIN — Medication 975 MILLIGRAM(S): at 22:00

## 2017-12-19 RX ADMIN — Medication 975 MILLIGRAM(S): at 22:01

## 2017-12-19 RX ADMIN — Medication 100 MILLIGRAM(S): at 12:44

## 2017-12-19 RX ADMIN — OXYCODONE HYDROCHLORIDE 10 MILLIGRAM(S): 5 TABLET ORAL at 10:09

## 2017-12-19 RX ADMIN — Medication 100 MILLIGRAM(S): at 22:00

## 2017-12-19 RX ADMIN — OXYCODONE HYDROCHLORIDE 10 MILLIGRAM(S): 5 TABLET ORAL at 19:00

## 2017-12-19 RX ADMIN — Medication 975 MILLIGRAM(S): at 06:07

## 2017-12-19 RX ADMIN — OXYCODONE HYDROCHLORIDE 10 MILLIGRAM(S): 5 TABLET ORAL at 15:16

## 2017-12-19 RX ADMIN — OXYCODONE HYDROCHLORIDE 10 MILLIGRAM(S): 5 TABLET ORAL at 06:04

## 2017-12-19 RX ADMIN — OXYCODONE HYDROCHLORIDE 10 MILLIGRAM(S): 5 TABLET ORAL at 06:08

## 2017-12-19 RX ADMIN — Medication 975 MILLIGRAM(S): at 13:09

## 2017-12-19 RX ADMIN — Medication 100 MILLIGRAM(S): at 06:06

## 2017-12-19 NOTE — PROGRESS NOTE ADULT - ASSESSMENT
47yom who states that he has right knee pain for the last few years which progressively worsened, had steroid injections to the knee multiple times which is no more helping, now s/p R TKA , POD # 1       Problem/Recommendation - 1:  Problem: Primary osteoarthritis of right knee. Recommendation: S/P R TKA.     Problem/Recommendation - 2:  ·  Problem: Status post right knee replacement.  Recommendation: PT/OT/pain mgmt  DVT prophylaxis- as per ortho  Abx as per SCIP  Incentive spirometry  Prophylaxis of opioid  induced constipation.      Problem/Recommendation - 3:  ·  Problem: Essential hypertension.  Recommendation: Episode of lightheadedness with BP on lower side ,continue home meds with parameters , continue ivf.     Problem/Recommendation - 4:  ·  Problem: Prophylactic measure.  Recommendation: DVT - prophylaxis - as per ortho team.      Problem/Recommendation - 5:  ·  Problem: Evaluation by medical service required.  Recommendation: Thank you for the courtesy of this consult . Will follow .    Problem / Plan - 6: Leucocytosis - no S/S of infection - likely reactive     Problem / Plan - 7: Anemia - likely postop ABLA   Problem / Plan - 8 : Presumed JOSE L - continue  and O2 as needed. 47yom who states that he has right knee pain for the last few years which progressively worsened, had steroid injections to the knee multiple times which is no more helping, now s/p R TKA , POD # 1. Found with hypoxia while asleep - presumed JOSE L , will continue  , will need sleep study as outpatient .       Problem/Recommendation - 1:  Problem: Primary osteoarthritis of right knee. Recommendation: S/P R TKA.     Problem/Recommendation - 2:  ·  Problem: Status post right knee replacement.  Recommendation: PT/OT/pain mgmt  DVT prophylaxis- as per ortho  Abx as per SCIP  Incentive spirometry  Prophylaxis of opioid  induced constipation.      Problem/Recommendation - 3:  ·  Problem: Essential hypertension.  Recommendation: Episode of lightheadedness with BP on lower side post op , now resolved with ivf , will d/c ivf at 3 pm if bp STABLE      Problem/Recommendation - 4:  ·  Problem: Prophylactic measure.  Recommendation: DVT - prophylaxis - as per ortho team.     Problem / Plan - 5: Leucocytosis - no S/S of infection - likely reactive     Problem / Plan - 6: Anemia - likely postop ABLA   Problem / Plan - 7 : Presumed JOSE L - continue  and O2 as needed, Will need sleep study as on outpatient . 47yom who states that he has right knee pain for the last few years which progressively worsened, had steroid injections to the knee multiple times which is no more helping, now s/p R TKA , POD # 1. Found with hypoxia while asleep - presumed JOSE L , will continue  , will need sleep study as outpatient .       Problem/Recommendation - 1:  Problem: Primary osteoarthritis of right knee. Recommendation: S/P R TKA.     Problem/Recommendation - 2:  ·  Problem: Status post right knee replacement.  Recommendation: PT/OT/pain mgmt  DVT prophylaxis- as per ortho  Abx as per SCIP  Incentive spirometry  Prophylaxis of opioid  induced constipation.      Problem/Recommendation - 3:  ·  Problem: Essential hypertension.  Recommendation: Episode of lightheadedness with BP on lower side post op , now resolved with ivf , will d/c ivf at 3 pm if bp STABLE      Problem/Recommendation - 4:  ·  Problem: Prophylactic measure.  Recommendation: DVT - prophylaxis - as per ortho team.     Problem / Plan - 5: Leucocytosis - no S/S of infection - likely reactive     Problem / Plan - 6: Anemia - likely postop ABLA   Problem / Plan - 7 : Hypoxia while asleep / Presumed JOSE L - continue  and O2 as needed, Will need sleep study as on outpatient .

## 2017-12-19 NOTE — PROGRESS NOTE ADULT - SUBJECTIVE AND OBJECTIVE BOX
pt pod 1 sp right TKA under spinal. Tolerated well. Denies any A/c. No B/a at this time.   pt with no n/v @ this time. using pain meds as ordered/needed with some pain relief. vss. spont vent. no issues with anesthesia at this time. pt resting comfortably @ this time.

## 2017-12-19 NOTE — PROGRESS NOTE ADULT - SUBJECTIVE AND OBJECTIVE BOX
Patient seen and examined . S/p R TKA   , POD # 1    CC : r=R knee pain       PAST MEDICAL & SURGICAL HISTORY:  Hypertension    S/P right knee arthroscopy: 2007      MEDICATIONS  (STANDING):  acetaminophen   Tablet. 975 milliGRAM(s) Oral every 8 hours  aspirin enteric coated 325 milliGRAM(s) Oral two times a day  ATENolol  Tablet 100 milliGRAM(s) Oral daily  docusate sodium 100 milliGRAM(s) Oral three times a day  doxazosin 4 milliGRAM(s) Oral daily  ferrous    sulfate 325 milliGRAM(s) Oral daily  folic acid 1 milliGRAM(s) Oral daily  lactated ringers. 1000 milliLiter(s) (100 mL/Hr) IV Continuous <Continuous>  lactated ringers. 1000 milliLiter(s) (100 mL/Hr) IV Continuous <Continuous>  lisinopril 10 milliGRAM(s) Oral daily  multivitamin 1 Tablet(s) Oral daily  NIFEdipine XL 90 milliGRAM(s) Oral daily  oxyCODONE  ER Tablet 10 milliGRAM(s) Oral every 12 hours    MEDICATIONS  (PRN):  acetaminophen   Tablet 650 milliGRAM(s) Oral every 6 hours PRN For Temp over 38.3 C (100.94 F)  aluminum hydroxide/magnesium hydroxide/simethicone Suspension 30 milliLiter(s) Oral four times a day PRN Indigestion  HYDROmorphone  Injectable 0.5 milliGRAM(s) IV Push every 4 hours PRN Severe Pain/breakthrough pain  HYDROmorphone  Injectable 0.5 milliGRAM(s) IV Push every 10 minutes PRN Moderate Pain (4 - 6)  magnesium hydroxide Suspension 30 milliLiter(s) Oral daily PRN Constipation  ondansetron Injectable 4 milliGRAM(s) IV Push every 6 hours PRN Nausea and/or Vomiting  ondansetron Injectable 4 milliGRAM(s) IV Push once PRN Nausea and/or Vomiting  oxyCODONE    IR 5 milliGRAM(s) Oral every 3 hours PRN Mild Pain  oxyCODONE    IR 10 milliGRAM(s) Oral every 3 hours PRN Moderate Pain  senna 2 Tablet(s) Oral at bedtime PRN Constipation      LABS:                          11.9   11.8  )-----------( 180      ( 19 Dec 2017 07:16 )             36.4     12-19    138  |  100  |  18.0  ----------------------------<  128<H>  4.4   |  26.0  |  1.14    Ca    8.3<L>      19 Dec 2017 07:16      RADIOLOGY & ADDITIONAL TESTS:    < from: Xray Knee 1 or 2 Views, Right (12.18.17 @ 15:38) >   EXAM:  KNEE-RIGHT                          PROCEDURE DATE:  12/18/2017          INTERPRETATION:  Right knee    Portable AP and lateral views.    Postoperative evaluation. Status post right knee replacement.    The prosthetic components are in goodposition. The alignment is normal.   Gas and fluid is present in the suprapatellar recess.    Impression: Right total knee replacement in good position                NAREN MOROCHO M.D., ATTENDING RADIOLOGIST    < end of copied text >        REVIEW OF SYSTEMS:    CONSTITUTIONAL: No fever, weight loss, or fatigue  EYES: No eye pain, visual disturbances, or discharge  ENMT:  No difficulty hearing, tinnitus, vertigo; No sinus or throat pain  NECK: No pain or stiffness  RESPIRATORY: No cough, wheezing, chills or hemoptysis; No shortness of breath  CARDIOVASCULAR: No chest pain, palpitations, dizziness, or leg swelling  GASTROINTESTINAL: No abdominal or epigastric pain. No nausea, vomiting, or hematemesis; No diarrhea or constipation. No melena or hematochezia.  GENITOURINARY: No dysuria, frequency, hematuria, or incontinence  NEUROLOGICAL: No headaches, memory loss, loss of strength, numbness, or tremors  SKIN: No itching, burning, rashes, or lesions   LYMPH NODES: No enlarged glands  ENDOCRINE: No heat or cold intolerance; No hair loss  MUSCULOSKELETAL: R knee pain   PSYCHIATRIC: No depression, anxiety, mood swings, or difficulty sleeping  HEME/LYMPH: No easy bruising, or bleeding gums  ALLERGY AND IMMUNOLOGIC: No hives or eczema    Vital Signs Last 24 Hrs  T(C): 36.7 (19 Dec 2017 09:06), Max: 37.2 (18 Dec 2017 21:36)  T(F): 98 (19 Dec 2017 09:06), Max: 98.9 (18 Dec 2017 21:36)  HR: 85 (19 Dec 2017 09:06) (63 - 99)  BP: 143/78 (19 Dec 2017 09:06) (99/83 - 143/78)  BP(mean): --  RR: 18 (19 Dec 2017 09:06) (11 - 18)  SpO2: 98% (19 Dec 2017 09:06) (95% - 100%)  PHYSICAL EXAM:    GENERAL: NAD, well-groomed, well-developed  HEAD:  Atraumatic, Normocephalic  EYES: EOMI, PERRLA, conjunctiva and sclera clear  NECK: Supple, No JVD, Normal thyroid  NERVOUS SYSTEM:  Alert & Oriented X3, no focal deficit  CHEST/LUNG: CTA b/l ,  no  rales, rhonchi, wheezing, or rubs  HEART: Regular rate and rhythm; No murmurs, rubs, or gallops  ABDOMEN: Soft, Nontender, Nondistended; Bowel sounds present  EXTREMITIES:  2+ Peripheral Pulses, No clubbing, cyanosis, or edema , R knee dressing + , clean and dry   LYMPH: No lymphadenopathy noted  SKIN: No rashes or lesions Patient seen and examined . S/p R TKA   , POD # 1    CC : R knee pain well controlled , no other complaints , lightheadedness resolved .      PAST MEDICAL & SURGICAL HISTORY:  Hypertension    S/P right knee arthroscopy: 2007      MEDICATIONS  (STANDING):  acetaminophen   Tablet. 975 milliGRAM(s) Oral every 8 hours  aspirin enteric coated 325 milliGRAM(s) Oral two times a day  ATENolol  Tablet 100 milliGRAM(s) Oral daily  docusate sodium 100 milliGRAM(s) Oral three times a day  doxazosin 4 milliGRAM(s) Oral daily  ferrous    sulfate 325 milliGRAM(s) Oral daily  folic acid 1 milliGRAM(s) Oral daily  lactated ringers. 1000 milliLiter(s) (100 mL/Hr) IV Continuous <Continuous>  lactated ringers. 1000 milliLiter(s) (100 mL/Hr) IV Continuous <Continuous>  lisinopril 10 milliGRAM(s) Oral daily  multivitamin 1 Tablet(s) Oral daily  NIFEdipine XL 90 milliGRAM(s) Oral daily  oxyCODONE  ER Tablet 10 milliGRAM(s) Oral every 12 hours    MEDICATIONS  (PRN):  acetaminophen   Tablet 650 milliGRAM(s) Oral every 6 hours PRN For Temp over 38.3 C (100.94 F)  aluminum hydroxide/magnesium hydroxide/simethicone Suspension 30 milliLiter(s) Oral four times a day PRN Indigestion  HYDROmorphone  Injectable 0.5 milliGRAM(s) IV Push every 4 hours PRN Severe Pain/breakthrough pain  HYDROmorphone  Injectable 0.5 milliGRAM(s) IV Push every 10 minutes PRN Moderate Pain (4 - 6)  magnesium hydroxide Suspension 30 milliLiter(s) Oral daily PRN Constipation  ondansetron Injectable 4 milliGRAM(s) IV Push every 6 hours PRN Nausea and/or Vomiting  ondansetron Injectable 4 milliGRAM(s) IV Push once PRN Nausea and/or Vomiting  oxyCODONE    IR 5 milliGRAM(s) Oral every 3 hours PRN Mild Pain  oxyCODONE    IR 10 milliGRAM(s) Oral every 3 hours PRN Moderate Pain  senna 2 Tablet(s) Oral at bedtime PRN Constipation      LABS:                          11.9   11.8  )-----------( 180      ( 19 Dec 2017 07:16 )             36.4     12-19    138  |  100  |  18.0  ----------------------------<  128<H>  4.4   |  26.0  |  1.14    Ca    8.3<L>      19 Dec 2017 07:16      RADIOLOGY & ADDITIONAL TESTS:    < from: Xray Knee 1 or 2 Views, Right (12.18.17 @ 15:38) >   EXAM:  KNEE-RIGHT                          PROCEDURE DATE:  12/18/2017          INTERPRETATION:  Right knee    Portable AP and lateral views.    Postoperative evaluation. Status post right knee replacement.    The prosthetic components are in goodposition. The alignment is normal.   Gas and fluid is present in the suprapatellar recess.    Impression: Right total knee replacement in good position                NAREN MOROCHO M.D., ATTENDING RADIOLOGIST    < end of copied text >        REVIEW OF SYSTEMS:    CONSTITUTIONAL: No fever, weight loss, or fatigue  EYES: No eye pain, visual disturbances, or discharge  ENMT:  No difficulty hearing, tinnitus, vertigo; No sinus or throat pain  NECK: No pain or stiffness  RESPIRATORY: No cough, wheezing, chills or hemoptysis; No shortness of breath  CARDIOVASCULAR: No chest pain, palpitations, dizziness, or leg swelling  GASTROINTESTINAL: No abdominal or epigastric pain. No nausea, vomiting, or hematemesis; No diarrhea or constipation. No melena or hematochezia.  GENITOURINARY: No dysuria, frequency, hematuria, or incontinence  NEUROLOGICAL: No headaches, memory loss, loss of strength, numbness, or tremors  SKIN: No itching, burning, rashes, or lesions   LYMPH NODES: No enlarged glands  ENDOCRINE: No heat or cold intolerance; No hair loss  MUSCULOSKELETAL: R knee pain well con trolled   PSYCHIATRIC: No depression, anxiety, mood swings, or difficulty sleeping  HEME/LYMPH: No easy bruising, or bleeding gums  ALLERGY AND IMMUNOLOGIC: No hives or eczema    Vital Signs Last 24 Hrs  T(C): 36.7 (19 Dec 2017 09:06), Max: 37.2 (18 Dec 2017 21:36)  T(F): 98 (19 Dec 2017 09:06), Max: 98.9 (18 Dec 2017 21:36)  HR: 85 (19 Dec 2017 09:06) (63 - 99)  BP: 143/78 (19 Dec 2017 09:06) (99/83 - 143/78)  BP(mean): --  RR: 18 (19 Dec 2017 09:06) (11 - 18)  SpO2: 98% (19 Dec 2017 09:06) (95% - 100%)  PHYSICAL EXAM:    GENERAL: NAD, well-groomed, well-developed  HEAD:  Atraumatic, Normocephalic  EYES: EOMI, PERRLA, conjunctiva and sclera clear  NECK: Supple, No JVD, Normal thyroid  NERVOUS SYSTEM:  Alert & Oriented X3, no focal deficit  CHEST/LUNG: CTA b/l ,  no  rales, rhonchi, wheezing, or rubs  HEART: Regular rate and rhythm; No murmurs, rubs, or gallops  ABDOMEN: Soft, Nontender, Nondistended; Bowel sounds present  EXTREMITIES:  2+ Peripheral Pulses, No clubbing, cyanosis, or edema , R knee dressing + , clean and dry   LYMPH: No lymphadenopathy noted  SKIN: No rashes or lesions

## 2017-12-19 NOTE — PROGRESS NOTE ADULT - SUBJECTIVE AND OBJECTIVE BOX
GURJIT SERRA    [id number]    History: 47y Male is status post right total knee arthroplasty, POD # 1. Patient is doing well and is comfortable. The patient's pain is controlled using the prescribed pain medications. The patient is participating in physical therapy. Denies nausea, vomiting, chest pain, shortness of breath, abdominal pain or fever. No new complaints.            MEDICATIONS  (STANDING):  acetaminophen   Tablet. 975 milliGRAM(s) Oral every 8 hours  aspirin enteric coated 325 milliGRAM(s) Oral two times a day  ATENolol  Tablet 100 milliGRAM(s) Oral daily  docusate sodium 100 milliGRAM(s) Oral three times a day  doxazosin 4 milliGRAM(s) Oral daily  ferrous    sulfate 325 milliGRAM(s) Oral daily  folic acid 1 milliGRAM(s) Oral daily  lactated ringers. 1000 milliLiter(s) (100 mL/Hr) IV Continuous <Continuous>  lactated ringers. 1000 milliLiter(s) (100 mL/Hr) IV Continuous <Continuous>  lisinopril 10 milliGRAM(s) Oral daily  multivitamin 1 Tablet(s) Oral daily  NIFEdipine XL 90 milliGRAM(s) Oral daily  oxyCODONE  ER Tablet 10 milliGRAM(s) Oral every 12 hours    MEDICATIONS  (PRN):  acetaminophen   Tablet 650 milliGRAM(s) Oral every 6 hours PRN For Temp over 38.3 C (100.94 F)  aluminum hydroxide/magnesium hydroxide/simethicone Suspension 30 milliLiter(s) Oral four times a day PRN Indigestion  HYDROmorphone  Injectable 0.5 milliGRAM(s) IV Push every 4 hours PRN Severe Pain/breakthrough pain  HYDROmorphone  Injectable 0.5 milliGRAM(s) IV Push every 10 minutes PRN Moderate Pain (4 - 6)  magnesium hydroxide Suspension 30 milliLiter(s) Oral daily PRN Constipation  ondansetron Injectable 4 milliGRAM(s) IV Push every 6 hours PRN Nausea and/or Vomiting  ondansetron Injectable 4 milliGRAM(s) IV Push once PRN Nausea and/or Vomiting  oxyCODONE    IR 5 milliGRAM(s) Oral every 3 hours PRN Mild Pain  oxyCODONE    IR 10 milliGRAM(s) Oral every 3 hours PRN Moderate Pain  senna 2 Tablet(s) Oral at bedtime PRN Constipation    Vital Signs Last 24 Hrs  T(C): 37.1 (19 Dec 2017 04:19), Max: 37.2 (18 Dec 2017 21:36)  T(F): 98.7 (19 Dec 2017 04:19), Max: 98.9 (18 Dec 2017 21:36)  HR: 99 (19 Dec 2017 04:19) (63 - 99)  BP: 119/76 (19 Dec 2017 04:19) (99/83 - 143/73)  BP(mean): --  RR: 16 (19 Dec 2017 04:19) (11 - 18)  SpO2: 96% (19 Dec 2017 04:19) (95% - 100%)  NAD  Physical exam: Right lower extremity- The right knee dressing is clean, dry and intact. No drainage or discharge. No erythema is noted. No blistering. No ecchymosis. The calf is supple nontender.  No calf tenderness. Sensation to light touch is grossly intact distally. Motor function distally is 5/5. Extensor hallucis longus and flexor hallucis longus are intact. No foot drop. 2+ dorsalis pedis pulse. Capillary refill is less than 2 seconds. No cyanosis.    Primary Orthopedic Assessment:  • s/p RIGHT total knee replacement, POD#1      Plan:   • DVT prophylaxis as prescribed- asa 325mg bid, including use of compression devices and ankle pumps  • Continue physical therapy  • Weightbearing as tolerated of the right lower extremity with assistance of a walker  • Incentive spirometry encouraged  • Pain control as clinically indicated  • Discharge planning – anticipated discharge is Home when cleared by PT and medicine

## 2017-12-20 VITALS
RESPIRATION RATE: 18 BRPM | HEART RATE: 87 BPM | SYSTOLIC BLOOD PRESSURE: 133 MMHG | DIASTOLIC BLOOD PRESSURE: 81 MMHG | OXYGEN SATURATION: 96 % | TEMPERATURE: 99 F

## 2017-12-20 LAB
ANION GAP SERPL CALC-SCNC: 10 MMOL/L — SIGNIFICANT CHANGE UP (ref 5–17)
BUN SERPL-MCNC: 13 MG/DL — SIGNIFICANT CHANGE UP (ref 8–20)
CALCIUM SERPL-MCNC: 8.5 MG/DL — LOW (ref 8.6–10.2)
CHLORIDE SERPL-SCNC: 101 MMOL/L — SIGNIFICANT CHANGE UP (ref 98–107)
CO2 SERPL-SCNC: 28 MMOL/L — SIGNIFICANT CHANGE UP (ref 22–29)
CREAT SERPL-MCNC: 0.93 MG/DL — SIGNIFICANT CHANGE UP (ref 0.5–1.3)
GLUCOSE SERPL-MCNC: 125 MG/DL — HIGH (ref 70–115)
HCT VFR BLD CALC: 33.2 % — LOW (ref 42–52)
HGB BLD-MCNC: 11 G/DL — LOW (ref 14–18)
MCHC RBC-ENTMCNC: 30.1 PG — SIGNIFICANT CHANGE UP (ref 27–31)
MCHC RBC-ENTMCNC: 33.1 G/DL — SIGNIFICANT CHANGE UP (ref 32–36)
MCV RBC AUTO: 90.7 FL — SIGNIFICANT CHANGE UP (ref 80–94)
PLATELET # BLD AUTO: 166 K/UL — SIGNIFICANT CHANGE UP (ref 150–400)
POTASSIUM SERPL-MCNC: 4.1 MMOL/L — SIGNIFICANT CHANGE UP (ref 3.5–5.3)
POTASSIUM SERPL-SCNC: 4.1 MMOL/L — SIGNIFICANT CHANGE UP (ref 3.5–5.3)
RBC # BLD: 3.66 M/UL — LOW (ref 4.6–6.2)
RBC # FLD: 13.1 % — SIGNIFICANT CHANGE UP (ref 11–15.6)
SODIUM SERPL-SCNC: 139 MMOL/L — SIGNIFICANT CHANGE UP (ref 135–145)
WBC # BLD: 10.3 K/UL — SIGNIFICANT CHANGE UP (ref 4.8–10.8)
WBC # FLD AUTO: 10.3 K/UL — SIGNIFICANT CHANGE UP (ref 4.8–10.8)

## 2017-12-20 PROCEDURE — 73560 X-RAY EXAM OF KNEE 1 OR 2: CPT

## 2017-12-20 PROCEDURE — 36415 COLL VENOUS BLD VENIPUNCTURE: CPT

## 2017-12-20 PROCEDURE — 97110 THERAPEUTIC EXERCISES: CPT

## 2017-12-20 PROCEDURE — 88311 DECALCIFY TISSUE: CPT

## 2017-12-20 PROCEDURE — C1713: CPT

## 2017-12-20 PROCEDURE — 99232 SBSQ HOSP IP/OBS MODERATE 35: CPT

## 2017-12-20 PROCEDURE — 85027 COMPLETE CBC AUTOMATED: CPT

## 2017-12-20 PROCEDURE — 80048 BASIC METABOLIC PNL TOTAL CA: CPT

## 2017-12-20 PROCEDURE — 97163 PT EVAL HIGH COMPLEX 45 MIN: CPT

## 2017-12-20 PROCEDURE — 97116 GAIT TRAINING THERAPY: CPT

## 2017-12-20 PROCEDURE — 88305 TISSUE EXAM BY PATHOLOGIST: CPT

## 2017-12-20 PROCEDURE — C1776: CPT

## 2017-12-20 RX ADMIN — ATENOLOL 100 MILLIGRAM(S): 25 TABLET ORAL at 05:34

## 2017-12-20 RX ADMIN — Medication 100 MILLIGRAM(S): at 05:34

## 2017-12-20 RX ADMIN — OXYCODONE HYDROCHLORIDE 10 MILLIGRAM(S): 5 TABLET ORAL at 12:00

## 2017-12-20 RX ADMIN — Medication 975 MILLIGRAM(S): at 12:00

## 2017-12-20 RX ADMIN — Medication 90 MILLIGRAM(S): at 05:34

## 2017-12-20 RX ADMIN — Medication 975 MILLIGRAM(S): at 11:38

## 2017-12-20 RX ADMIN — Medication 1 MILLIGRAM(S): at 11:37

## 2017-12-20 RX ADMIN — OXYCODONE HYDROCHLORIDE 10 MILLIGRAM(S): 5 TABLET ORAL at 05:43

## 2017-12-20 RX ADMIN — LISINOPRIL 10 MILLIGRAM(S): 2.5 TABLET ORAL at 05:35

## 2017-12-20 RX ADMIN — Medication 4 MILLIGRAM(S): at 05:34

## 2017-12-20 RX ADMIN — Medication 325 MILLIGRAM(S): at 05:34

## 2017-12-20 RX ADMIN — MAGNESIUM HYDROXIDE 30 MILLILITER(S): 400 TABLET, CHEWABLE ORAL at 11:40

## 2017-12-20 RX ADMIN — Medication 325 MILLIGRAM(S): at 11:37

## 2017-12-20 RX ADMIN — Medication 1 TABLET(S): at 11:38

## 2017-12-20 RX ADMIN — Medication 975 MILLIGRAM(S): at 05:33

## 2017-12-20 RX ADMIN — Medication 975 MILLIGRAM(S): at 05:39

## 2017-12-20 RX ADMIN — OXYCODONE HYDROCHLORIDE 10 MILLIGRAM(S): 5 TABLET ORAL at 11:38

## 2017-12-20 RX ADMIN — Medication 100 MILLIGRAM(S): at 11:37

## 2017-12-20 RX ADMIN — OXYCODONE HYDROCHLORIDE 10 MILLIGRAM(S): 5 TABLET ORAL at 05:38

## 2017-12-20 NOTE — PROGRESS NOTE ADULT - SUBJECTIVE AND OBJECTIVE BOX
Patient seen and examined . S/p  R TKA  , POD # 2    CC : R knee pain         PAST MEDICAL & SURGICAL HISTORY:  Hypertension  S/P right knee arthroscopy: 2007      MEDICATIONS  (STANDING):  acetaminophen   Tablet. 975 milliGRAM(s) Oral every 8 hours  aspirin enteric coated 325 milliGRAM(s) Oral two times a day  ATENolol  Tablet 100 milliGRAM(s) Oral daily  celecoxib 200 milliGRAM(s) Oral two times a day before meals  docusate sodium 100 milliGRAM(s) Oral three times a day  doxazosin 4 milliGRAM(s) Oral daily  ferrous    sulfate 325 milliGRAM(s) Oral daily  folic acid 1 milliGRAM(s) Oral daily  lisinopril 10 milliGRAM(s) Oral daily  multivitamin 1 Tablet(s) Oral daily  NIFEdipine XL 90 milliGRAM(s) Oral daily  oxyCODONE  ER Tablet 10 milliGRAM(s) Oral every 12 hours    MEDICATIONS  (PRN):  acetaminophen   Tablet 650 milliGRAM(s) Oral every 6 hours PRN For Temp over 38.3 C (100.94 F)  aluminum hydroxide/magnesium hydroxide/simethicone Suspension 30 milliLiter(s) Oral four times a day PRN Indigestion  HYDROmorphone  Injectable 0.5 milliGRAM(s) IV Push every 4 hours PRN Severe Pain/breakthrough pain  magnesium hydroxide Suspension 30 milliLiter(s) Oral daily PRN Constipation  ondansetron Injectable 4 milliGRAM(s) IV Push every 6 hours PRN Nausea and/or Vomiting  ondansetron Injectable 4 milliGRAM(s) IV Push once PRN Nausea and/or Vomiting  oxyCODONE    IR 5 milliGRAM(s) Oral every 3 hours PRN Mild Pain  oxyCODONE    IR 10 milliGRAM(s) Oral every 3 hours PRN Moderate Pain  senna 2 Tablet(s) Oral at bedtime PRN Constipation      LABS:                          11.0   10.3  )-----------( 166      ( 20 Dec 2017 08:05 )             33.2     12-20    139  |  101  |  13.0  ----------------------------<  125<H>  4.1   |  28.0  |  0.93    Ca    8.5<L>      20 Dec 2017 08:05    REVIEW OF SYSTEMS:    CONSTITUTIONAL: No fever, weight loss, or fatigue  EYES: No eye pain, visual disturbances, or discharge  ENMT:  No difficulty hearing, tinnitus, vertigo; No sinus or throat pain  NECK: No pain or stiffness  RESPIRATORY: No cough, wheezing, chills or hemoptysis; No shortness of breath  CARDIOVASCULAR: No chest pain, palpitations, dizziness, or leg swelling  GASTROINTESTINAL: No abdominal or epigastric pain. No nausea, vomiting, or hematemesis; No diarrhea or constipation. No melena or hematochezia.  GENITOURINARY: No dysuria, frequency, hematuria, or incontinence  NEUROLOGICAL: No headaches, memory loss, loss of strength, numbness, or tremors  SKIN: No itching, burning, rashes, or lesions   LYMPH NODES: No enlarged glands  ENDOCRINE: No heat or cold intolerance; No hair loss  MUSCULOSKELETAL: R knee pain   PSYCHIATRIC: No depression, anxiety, mood swings, or difficulty sleeping  HEME/LYMPH: No easy bruising, or bleeding gums  ALLERGY AND IMMUNOLOGIC: No hives or eczema    Vital Signs Last 24 Hrs  T(C): 37.1 (20 Dec 2017 09:36), Max: 37.4 (19 Dec 2017 23:57)  T(F): 98.7 (20 Dec 2017 09:36), Max: 99.3 (19 Dec 2017 23:57)  HR: 87 (20 Dec 2017 09:36) (78 - 99)  BP: 133/81 (20 Dec 2017 09:36) (118/72 - 133/81)  BP(mean): --  RR: 18 (20 Dec 2017 09:36) (16 - 20)  SpO2: 96% (20 Dec 2017 09:36) (92% - 96%)  PHYSICAL EXAM:    GENERAL: NAD, well-groomed, well-developed  HEAD:  Atraumatic, Normocephalic  EYES: EOMI, PERRLA, conjunctiva and sclera clear  NECK: Supple, No JVD, Normal thyroid  NERVOUS SYSTEM:  Alert & Oriented X3, no focal deficit  CHEST/LUNG: CTA b/l ,  no  rales, rhonchi, wheezing, or rubs  HEART: Regular rate and rhythm; No murmurs, rubs, or gallops  ABDOMEN: Soft, Nontender, Nondistended; Bowel sounds present  EXTREMITIES:  2+ Peripheral Pulses, No clubbing, cyanosis, or edema , R knee dressing + , clean and dry   LYMPH: No lymphadenopathy noted  SKIN: No rashes or lesions Patient seen and examined . S/p  R TKA  , POD # 2    CC : R knee pain well controlled , no other complaints .        PAST MEDICAL & SURGICAL HISTORY:  Hypertension  S/P right knee arthroscopy: 2007      MEDICATIONS  (STANDING):  acetaminophen   Tablet. 975 milliGRAM(s) Oral every 8 hours  aspirin enteric coated 325 milliGRAM(s) Oral two times a day  ATENolol  Tablet 100 milliGRAM(s) Oral daily  celecoxib 200 milliGRAM(s) Oral two times a day before meals  docusate sodium 100 milliGRAM(s) Oral three times a day  doxazosin 4 milliGRAM(s) Oral daily  ferrous    sulfate 325 milliGRAM(s) Oral daily  folic acid 1 milliGRAM(s) Oral daily  lisinopril 10 milliGRAM(s) Oral daily  multivitamin 1 Tablet(s) Oral daily  NIFEdipine XL 90 milliGRAM(s) Oral daily  oxyCODONE  ER Tablet 10 milliGRAM(s) Oral every 12 hours    MEDICATIONS  (PRN):  acetaminophen   Tablet 650 milliGRAM(s) Oral every 6 hours PRN For Temp over 38.3 C (100.94 F)  aluminum hydroxide/magnesium hydroxide/simethicone Suspension 30 milliLiter(s) Oral four times a day PRN Indigestion  HYDROmorphone  Injectable 0.5 milliGRAM(s) IV Push every 4 hours PRN Severe Pain/breakthrough pain  magnesium hydroxide Suspension 30 milliLiter(s) Oral daily PRN Constipation  ondansetron Injectable 4 milliGRAM(s) IV Push every 6 hours PRN Nausea and/or Vomiting  ondansetron Injectable 4 milliGRAM(s) IV Push once PRN Nausea and/or Vomiting  oxyCODONE    IR 5 milliGRAM(s) Oral every 3 hours PRN Mild Pain  oxyCODONE    IR 10 milliGRAM(s) Oral every 3 hours PRN Moderate Pain  senna 2 Tablet(s) Oral at bedtime PRN Constipation      LABS:                          11.0   10.3  )-----------( 166      ( 20 Dec 2017 08:05 )             33.2     12-20    139  |  101  |  13.0  ----------------------------<  125<H>  4.1   |  28.0  |  0.93    Ca    8.5<L>      20 Dec 2017 08:05    REVIEW OF SYSTEMS:    CONSTITUTIONAL: No fever, weight loss, or fatigue  EYES: No eye pain, visual disturbances, or discharge  ENMT:  No difficulty hearing, tinnitus, vertigo; No sinus or throat pain  NECK: No pain or stiffness  RESPIRATORY: No cough, wheezing, chills or hemoptysis; No shortness of breath  CARDIOVASCULAR: No chest pain, palpitations, dizziness, or leg swelling  GASTROINTESTINAL: No abdominal or epigastric pain. No nausea, vomiting, or hematemesis; No diarrhea or constipation. No melena or hematochezia.  GENITOURINARY: No dysuria, frequency, hematuria, or incontinence  NEUROLOGICAL: No headaches, memory loss, loss of strength, numbness, or tremors  SKIN: No itching, burning, rashes, or lesions   LYMPH NODES: No enlarged glands  ENDOCRINE: No heat or cold intolerance; No hair loss  MUSCULOSKELETAL: R knee pain  well controlled   PSYCHIATRIC: No depression, anxiety, mood swings, or difficulty sleeping  HEME/LYMPH: No easy bruising, or bleeding gums  ALLERGY AND IMMUNOLOGIC: No hives or eczema    Vital Signs Last 24 Hrs  T(C): 37.1 (20 Dec 2017 09:36), Max: 37.4 (19 Dec 2017 23:57)  T(F): 98.7 (20 Dec 2017 09:36), Max: 99.3 (19 Dec 2017 23:57)  HR: 87 (20 Dec 2017 09:36) (78 - 99)  BP: 133/81 (20 Dec 2017 09:36) (118/72 - 133/81)  BP(mean): --  RR: 18 (20 Dec 2017 09:36) (16 - 20)  SpO2: 96% (20 Dec 2017 09:36) (92% - 96%)  PHYSICAL EXAM:    GENERAL: NAD, well-groomed, well-developed  HEAD:  Atraumatic, Normocephalic  EYES: EOMI, PERRLA, conjunctiva and sclera clear  NECK: Supple, No JVD, Normal thyroid  NERVOUS SYSTEM:  Alert & Oriented X3, no focal deficit  CHEST/LUNG: CTA b/l ,  no  rales, rhonchi, wheezing, or rubs  HEART: Regular rate and rhythm; No murmurs, rubs, or gallops  ABDOMEN: Soft, Nontender, Nondistended; Bowel sounds present  EXTREMITIES:  2+ Peripheral Pulses, No clubbing, cyanosis, or edema , R knee dressing + , clean and dry   LYMPH: No lymphadenopathy noted  SKIN: No rashes or lesions

## 2017-12-20 NOTE — PROGRESS NOTE ADULT - ASSESSMENT
47yom who states that he has right knee pain for the last few years which progressively worsened, had steroid injections to the knee multiple times which is no more helping, now s/p R TKA , POD # 2 . Found with hypoxia while asleep - presumed JOSE L , will continue  , will need sleep study as outpatient .       Problem/Recommendation - 1:  Problem: Primary osteoarthritis of right knee. Recommendation: S/P R TKA.     Problem/Recommendation - 2:  ·  Problem: Status post right knee replacement.  Recommendation: PT/OT/pain mgmt  DVT prophylaxis- as per ortho  Abx as per SCIP  Incentive spirometry  Prophylaxis of opioid  induced constipation.      Problem/Recommendation - 3:  ·  Problem: Essential hypertension.  Recommendation: Episode of lightheadedness with BP on lower side post op , now resolved     Problem/Recommendation - 4:  ·  Problem: Prophylactic measure.  Recommendation: DVT - prophylaxis - as per ortho team.     Problem / Plan - 5: Leucocytosis - no S/S of infection - likely reactive - resolved .    Problem / Plan - 6: Anemia - likely postop ABLA   Problem / Plan - 7 : Hypoxia while asleep / Presumed JOSE L - continue  and O2 as needed, Will need sleep study as on outpatient . Patient informed and advised to have Sleep study

## 2017-12-20 NOTE — PROGRESS NOTE ADULT - SUBJECTIVE AND OBJECTIVE BOX
GURJIT SERRA    [id number]    History: 47y Male is status post right total knee arthroplasty on 12/18/2017, POD # 02.  Patient is doing well and is comfortable. The patient's pain is controlled using the prescribed pain medications. The patient is participating in physical therapy. Denies nausea, vomiting, chest pain, shortness of breath, abdominal pain or fever. No new complaints.      Vital Signs Last 24 Hrs  T(C): 37.1 (20 Dec 2017 04:34), Max: 37.4 (19 Dec 2017 23:57)  T(F): 98.7 (20 Dec 2017 04:34), Max: 99.3 (19 Dec 2017 23:57)  HR: 99 (20 Dec 2017 04:34) (78 - 99)  BP: 127/72 (20 Dec 2017 04:34) (118/72 - 143/78)  BP(mean): --  RR: 20 (20 Dec 2017 04:34) (16 - 20)  SpO2: 93% (20 Dec 2017 04:34) (92% - 98%)      Physical exam: The right knee dressing is clean, dry and intact. No drainage or discharge. No erythema is noted. No blistering. No ecchymosis.  New dressing placed.  The calf is supple nontender. Passive range of motion is acceptable to due postoperative pain. No calf tenderness. Sensation to light touch is grossly intact distally. Motor function distally is 5/5. Extensor hallucis longus and flexor hallucis longus are intact. No foot drop. 2+ dorsalis pedis pulse. Capillary refill is less than 2 seconds. No cyanosis.    Primary Orthopedic Assessment:  • s/p RIGHT total knee replacement pod #2    Plan:   • DVT prophylaxis as prescribed, including use of compression devices and ankle pumps  • Continue physical therapy  • Weightbearing as tolerated of the right lower extremity with assistance of a walker  • Incentive spirometry encouraged  • Pain control as clinically indicated  • Discharge planning – anticipated discharge is Home today: AM labs pending

## 2017-12-27 LAB — SURGICAL PATHOLOGY FINAL REPORT - CH: SIGNIFICANT CHANGE UP

## 2017-12-28 DIAGNOSIS — M17.11 UNILATERAL PRIMARY OSTEOARTHRITIS, RIGHT KNEE: ICD-10-CM

## 2017-12-29 PROBLEM — M17.11 PATELLOFEMORAL ARTHRITIS OF RIGHT KNEE: Status: ACTIVE | Noted: 2017-10-18

## 2017-12-29 PROBLEM — M17.11 PRIMARY OSTEOARTHRITIS OF RIGHT KNEE: Status: ACTIVE | Noted: 2017-10-18

## 2017-12-29 RX ORDER — OXYCODONE 5 MG/1
5 TABLET ORAL EVERY 8 HOURS
Qty: 60 | Refills: 0 | Status: ACTIVE | COMMUNITY
Start: 2017-12-29 | End: 1900-01-01

## 2018-01-10 ENCOUNTER — APPOINTMENT (OUTPATIENT)
Dept: ORTHOPEDIC SURGERY | Facility: CLINIC | Age: 48
End: 2018-01-10
Payer: COMMERCIAL

## 2018-01-10 PROCEDURE — 99024 POSTOP FOLLOW-UP VISIT: CPT

## 2018-02-07 ENCOUNTER — APPOINTMENT (OUTPATIENT)
Dept: ORTHOPEDIC SURGERY | Facility: CLINIC | Age: 48
End: 2018-02-07
Payer: COMMERCIAL

## 2018-02-07 VITALS
SYSTOLIC BLOOD PRESSURE: 129 MMHG | HEIGHT: 70 IN | BODY MASS INDEX: 32.93 KG/M2 | WEIGHT: 230 LBS | DIASTOLIC BLOOD PRESSURE: 95 MMHG | HEART RATE: 92 BPM | TEMPERATURE: 98.5 F

## 2018-02-07 PROCEDURE — 73562 X-RAY EXAM OF KNEE 3: CPT | Mod: RT

## 2018-02-07 PROCEDURE — 99024 POSTOP FOLLOW-UP VISIT: CPT

## 2018-04-04 ENCOUNTER — APPOINTMENT (OUTPATIENT)
Dept: ORTHOPEDIC SURGERY | Facility: CLINIC | Age: 48
End: 2018-04-04
Payer: COMMERCIAL

## 2018-04-04 VITALS
BODY MASS INDEX: 32.93 KG/M2 | HEIGHT: 70 IN | HEART RATE: 98 BPM | WEIGHT: 230 LBS | SYSTOLIC BLOOD PRESSURE: 143 MMHG | TEMPERATURE: 98.6 F | DIASTOLIC BLOOD PRESSURE: 90 MMHG

## 2018-04-04 PROCEDURE — 99212 OFFICE O/P EST SF 10 MIN: CPT

## 2018-09-10 ENCOUNTER — OTHER (OUTPATIENT)
Age: 48
End: 2018-09-10

## 2018-09-26 PROBLEM — I10 ESSENTIAL (PRIMARY) HYPERTENSION: Chronic | Status: ACTIVE | Noted: 2017-11-27

## 2018-10-24 ENCOUNTER — APPOINTMENT (OUTPATIENT)
Dept: ORTHOPEDIC SURGERY | Facility: CLINIC | Age: 48
End: 2018-10-24
Payer: COMMERCIAL

## 2018-10-24 VITALS
BODY MASS INDEX: 32.93 KG/M2 | HEART RATE: 86 BPM | DIASTOLIC BLOOD PRESSURE: 89 MMHG | WEIGHT: 230 LBS | TEMPERATURE: 97.8 F | HEIGHT: 70 IN | SYSTOLIC BLOOD PRESSURE: 135 MMHG

## 2018-10-24 PROCEDURE — 99213 OFFICE O/P EST LOW 20 MIN: CPT

## 2019-03-21 NOTE — BRIEF OPERATIVE NOTE - PRE-OP DX
56 y/o M with h/o HTN, hyperlipidemia, vertigo here after syncopal episode.  Pt reports he was attempting to urinate and passed out.  Pt reports feeling lightheaded prior to this.  Pt reports difficulty urinating over past months, at times needed to strain.  Pt fell backwards into the tub and stuck his head.  Wife heard him fall and found him in the bathroom.  Reports LOC of less than an minute.  Pt able to stand on own with assistance afterwards.    No recent illness, change in appetite, vomiting.  No preceding palpitations, CP, SOB, HA, vision changes.  Pt does not take any antiplatelet or anticoagulants. Primary osteoarthritis of right knee  12/18/2017    Active  Angie Chen 56 y/o M with h/o HTN, hyperlipidemia, vertigo here after syncopal episode.  Pt reports he was attempting to urinate and passed out.  Pt reports feeling lightheaded vs. vertigo prior to this.  Pt reports difficulty urinating over past months, at times needed to strain. Takes Tamsulosin but ran out several days ago and just refilled yesterday, now back on it. This morning, pt fell backwards into the tub and stuck his head. Wife heard him fall and found him in the bathroom.  Reports LOC of less than an minute.  Pt able to stand on own with assistance afterwards. + small lac vs. abrasion on vertex of scalp, no active bleeding, + neck pain. No c-collar in place upon arrival. C-collar placed in ED when evaluated.     No recent illness, change in appetite, vomiting.  No preceding palpitations, CP, SOB, HA, vision changes.  Pt does not take any antiplatelet or anticoagulants. Took cyclobenzaprine x 1 last night for back pain. Denies etoh use, drug use. Infrequently smokes cigar. Quit cigarettes 20-25 years ago.     PMD: Dr. Racheal Mena  Card: Dr. Coyle  Bariatric Surgeon: Brady

## 2019-03-27 ENCOUNTER — APPOINTMENT (OUTPATIENT)
Dept: ORTHOPEDIC SURGERY | Facility: CLINIC | Age: 49
End: 2019-03-27
Payer: COMMERCIAL

## 2019-03-27 VITALS
HEIGHT: 70 IN | SYSTOLIC BLOOD PRESSURE: 135 MMHG | DIASTOLIC BLOOD PRESSURE: 89 MMHG | HEART RATE: 87 BPM | BODY MASS INDEX: 32.93 KG/M2 | TEMPERATURE: 97.9 F | WEIGHT: 230 LBS

## 2019-03-27 DIAGNOSIS — Z96.651 AFTERCARE FOLLOWING JOINT REPLACEMENT SURGERY: ICD-10-CM

## 2019-03-27 DIAGNOSIS — Z47.1 AFTERCARE FOLLOWING JOINT REPLACEMENT SURGERY: ICD-10-CM

## 2019-03-27 PROCEDURE — 99213 OFFICE O/P EST LOW 20 MIN: CPT

## 2019-03-27 PROCEDURE — 73562 X-RAY EXAM OF KNEE 3: CPT | Mod: RT

## 2019-03-27 PROCEDURE — 73564 X-RAY EXAM KNEE 4 OR MORE: CPT | Mod: LT

## 2019-03-27 NOTE — HISTORY OF PRESENT ILLNESS
[Pain Location] : pain [de-identified] : Patient is a 48 year old male who presents for follow up of right TKA from 12/17 and left knee pain \par Right TKA no pain, doing great, no locking, buckling, clicking or swelling\par Left knee chronic pain which increased 2 days ago after bowling. positive medial pain, no locking, buckling, clicking or swelling

## 2019-03-27 NOTE — PHYSICAL EXAM
[Normal] : Gait: normal [LE] : Sensory: Intact in bilateral lower extremities [DP] : dorsalis pedis 2+ and symmetric bilaterally [PT] : posterior tibial 2+ and symmetric bilaterally [Obese] : obese [Poor Appearance] : well-appearing [Acute Distress] : not in acute distress [de-identified] : General appearance: Well nourished, well developed, pleasant, alert, and oriented x3.\par Respiratory: Breathing not labored, in no acute distress.\par HEENT: Normocephalic. EOM intact. Sclerae are clear.\par CV: No apparent abnormalities. No lower leg edema. No varicosities. Pedal pulses are palpable.\par Neurologic: Sensation is intact to light touch in the upper and lower extremities. No muscle weakness.\par Dermatologic: No apparent skin lesions or rash.\par Spine: C spine and L spine appear normal and move freely, normal and nontender.\par Upper Extremities: Hands, wrists, and elbows are normal and move freely. Shoulders are normal and move freely. All range of motion is symmetrical.\par Normal body habitus. Pulses are palpable.\par Review of systems, please see form for complete details. Medical data sheet was reviewed.\par \par Left knee, FROM hip, mild effusion, 0 - 125 , no crepitus, medial joint line tenderness, no lateral pain, no Lachman, no pivot shift, no anterior or posterior drawer, stable, anatomic alignment \par Right knee, FROM hip, midline incision well healed, no effusion, 0 - 125, no crepitus, no medial pain, no lateral pain, no anterior or posterior drawer, stable, anatomic alignment \par   [de-identified] : Right Knee xrays, taken at the office today:\par Standing AP, Lateral, and Merchant films show:\par Total knee replacement hardware in neutral alignment, without evidence of loosening, well centered patella \par \par Xrays, taken at the office today show:\par \par Left Knee xrays:\par Standing AP, Lateral, and Merchant films:\par Mild DJD, joint spaces preserved, alignment maintained,

## 2019-03-27 NOTE — REASON FOR VISIT
[Follow-Up Visit] : a follow-up visit for [Knee Pain] : knee pain [FreeTextEntry2] : Left knee pain s/p Right TKR 12/18/17

## 2019-03-27 NOTE — ADDENDUM
[FreeTextEntry1] : I, Colin Cardenas, acted solely as a scribe for Dr. Jerry Espinosa on 03/27/2019.\par \par All medical record entries made by the scribe were at my, Dr. Jerry Espniosa, direction and personally dictated by me on 03/27/2019. I have reviewed the chart and agree that the record accurately reflects my personal performance of the history, physical exam, assessment and plan. I have also personally directed, reviewed, and agreed with the chart.

## 2019-03-27 NOTE — DISCUSSION/SUMMARY
[de-identified] : 48 year old male s/p right TKA DOS:12/18/17, left knee probable meniscal tear. He is doing well 2 years postop on the right knee. We talked about the nature of the condition and treatment options. His exam is concerning for a medial meniscal tear. At this point, I recommended further evaluation with imaging. An MRI of the left knee was ordered to evaluate for medial meniscal tear. He will continue Ice, NSAIDS as discussed. The patient was advised to follow up here after the imaging.

## 2019-04-01 ENCOUNTER — FORM ENCOUNTER (OUTPATIENT)
Age: 49
End: 2019-04-01

## 2019-04-02 ENCOUNTER — APPOINTMENT (OUTPATIENT)
Dept: MRI IMAGING | Facility: CLINIC | Age: 49
End: 2019-04-02
Payer: COMMERCIAL

## 2019-04-02 ENCOUNTER — OUTPATIENT (OUTPATIENT)
Dept: OUTPATIENT SERVICES | Facility: HOSPITAL | Age: 49
LOS: 1 days | End: 2019-04-02
Payer: COMMERCIAL

## 2019-04-02 DIAGNOSIS — Z98.890 OTHER SPECIFIED POSTPROCEDURAL STATES: Chronic | ICD-10-CM

## 2019-04-02 DIAGNOSIS — Z47.1 AFTERCARE FOLLOWING JOINT REPLACEMENT SURGERY: ICD-10-CM

## 2019-04-02 DIAGNOSIS — M23.92 UNSPECIFIED INTERNAL DERANGEMENT OF LEFT KNEE: ICD-10-CM

## 2019-04-02 PROCEDURE — 73721 MRI JNT OF LWR EXTRE W/O DYE: CPT

## 2019-04-02 PROCEDURE — 73721 MRI JNT OF LWR EXTRE W/O DYE: CPT | Mod: 26,LT

## 2019-04-04 ENCOUNTER — RESULT REVIEW (OUTPATIENT)
Age: 49
End: 2019-04-04

## 2019-04-09 ENCOUNTER — OUTPATIENT (OUTPATIENT)
Dept: OUTPATIENT SERVICES | Facility: HOSPITAL | Age: 49
LOS: 1 days | End: 2019-04-09
Payer: COMMERCIAL

## 2019-04-09 VITALS
RESPIRATION RATE: 16 BRPM | HEART RATE: 85 BPM | TEMPERATURE: 97 F | SYSTOLIC BLOOD PRESSURE: 128 MMHG | DIASTOLIC BLOOD PRESSURE: 87 MMHG | WEIGHT: 235.89 LBS | HEIGHT: 70 IN

## 2019-04-09 DIAGNOSIS — Z29.9 ENCOUNTER FOR PROPHYLACTIC MEASURES, UNSPECIFIED: ICD-10-CM

## 2019-04-09 DIAGNOSIS — Z98.890 OTHER SPECIFIED POSTPROCEDURAL STATES: Chronic | ICD-10-CM

## 2019-04-09 DIAGNOSIS — S83.232A COMPLEX TEAR OF MEDIAL MENISCUS, CURRENT INJURY, LEFT KNEE, INITIAL ENCOUNTER: ICD-10-CM

## 2019-04-09 DIAGNOSIS — Z96.659 PRESENCE OF UNSPECIFIED ARTIFICIAL KNEE JOINT: Chronic | ICD-10-CM

## 2019-04-09 DIAGNOSIS — Z13.89 ENCOUNTER FOR SCREENING FOR OTHER DISORDER: ICD-10-CM

## 2019-04-09 DIAGNOSIS — I10 ESSENTIAL (PRIMARY) HYPERTENSION: ICD-10-CM

## 2019-04-09 LAB
ANION GAP SERPL CALC-SCNC: 14 MMOL/L — SIGNIFICANT CHANGE UP (ref 5–17)
BUN SERPL-MCNC: 16 MG/DL — SIGNIFICANT CHANGE UP (ref 8–20)
CALCIUM SERPL-MCNC: 9.5 MG/DL — SIGNIFICANT CHANGE UP (ref 8.6–10.2)
CHLORIDE SERPL-SCNC: 102 MMOL/L — SIGNIFICANT CHANGE UP (ref 98–107)
CO2 SERPL-SCNC: 27 MMOL/L — SIGNIFICANT CHANGE UP (ref 22–29)
CREAT SERPL-MCNC: 1.03 MG/DL — SIGNIFICANT CHANGE UP (ref 0.5–1.3)
GLUCOSE SERPL-MCNC: 110 MG/DL — SIGNIFICANT CHANGE UP (ref 70–115)
HCT VFR BLD CALC: 47.6 % — SIGNIFICANT CHANGE UP (ref 42–52)
HGB BLD-MCNC: 15.7 G/DL — SIGNIFICANT CHANGE UP (ref 14–18)
MCHC RBC-ENTMCNC: 29.7 PG — SIGNIFICANT CHANGE UP (ref 27–31)
MCHC RBC-ENTMCNC: 33 G/DL — SIGNIFICANT CHANGE UP (ref 32–36)
MCV RBC AUTO: 90 FL — SIGNIFICANT CHANGE UP (ref 80–94)
PLATELET # BLD AUTO: 245 K/UL — SIGNIFICANT CHANGE UP (ref 150–400)
POTASSIUM SERPL-MCNC: 4 MMOL/L — SIGNIFICANT CHANGE UP (ref 3.5–5.3)
POTASSIUM SERPL-SCNC: 4 MMOL/L — SIGNIFICANT CHANGE UP (ref 3.5–5.3)
RBC # BLD: 5.29 M/UL — SIGNIFICANT CHANGE UP (ref 4.6–6.2)
RBC # FLD: 13.1 % — SIGNIFICANT CHANGE UP (ref 11–15.6)
SODIUM SERPL-SCNC: 143 MMOL/L — SIGNIFICANT CHANGE UP (ref 135–145)
WBC # BLD: 7 K/UL — SIGNIFICANT CHANGE UP (ref 4.8–10.8)
WBC # FLD AUTO: 7 K/UL — SIGNIFICANT CHANGE UP (ref 4.8–10.8)

## 2019-04-09 PROCEDURE — 93010 ELECTROCARDIOGRAM REPORT: CPT

## 2019-04-09 PROCEDURE — 85027 COMPLETE CBC AUTOMATED: CPT

## 2019-04-09 PROCEDURE — 93005 ELECTROCARDIOGRAM TRACING: CPT

## 2019-04-09 PROCEDURE — G0463: CPT

## 2019-04-09 PROCEDURE — 80048 BASIC METABOLIC PNL TOTAL CA: CPT

## 2019-04-09 PROCEDURE — 36415 COLL VENOUS BLD VENIPUNCTURE: CPT

## 2019-04-09 RX ORDER — CHOLECALCIFEROL (VITAMIN D3) 125 MCG
1 CAPSULE ORAL
Qty: 0 | Refills: 0 | COMMUNITY

## 2019-04-09 RX ORDER — DOXAZOSIN MESYLATE 4 MG
1 TABLET ORAL
Qty: 0 | Refills: 0 | COMMUNITY

## 2019-04-09 RX ORDER — PREGABALIN 225 MG/1
0 CAPSULE ORAL
Qty: 0 | Refills: 0 | COMMUNITY

## 2019-04-09 NOTE — H&P PST ADULT - ASSESSMENT
CAPRINI VTE 2.0 SCORE [CLOT updated 2019]    AGE RELATED RISK FACTORS                                                       MOBILITY RELATED FACTORS  [ ] Age 41-60 years                                            (1 Point)                    [ ] Bed rest                                                        (1 Point)  [ ] Age: 61-74 years                                           (2 Points)                  [ ] Plaster cast                                                   (2 Points)  [ ] Age= 75 years                                              (3 Points)                    [ ] Bed bound for more than 72 hours                 (2 Points)    DISEASE RELATED RISK FACTORS                                               GENDER SPECIFIC FACTORS  [ ] Edema in the lower extremities                       (1 Point)              [ ] Pregnancy                                                     (1 Point)  [ ] Varicose veins                                               (1 Point)                     [ ] Post-partum < 6 weeks                                   (1 Point)             [ ] BMI > 25 Kg/m2                                            (1 Point)                     [ ] Hormonal therapy  or oral contraception          (1 Point)                 [ ] Sepsis (in the previous month)                        (1 Point)               [ ] History of pregnancy complications                 (1 point)  [ ] Pneumonia or serious lung disease                                               [ ] Unexplained or recurrent                     (1 Point)           (in the previous month)                               (1 Point)  [ ] Abnormal pulmonary function test                     (1 Point)                 SURGERY RELATED RISK FACTORS  [ ] Acute myocardial infarction                              (1 Point)               [ ]  Section                                             (1 Point)  [ ] Congestive heart failure (in the previous month)  (1 Point)      [ ] Minor surgery                                                  (1 Point)   [ ] Inflammatory bowel disease                             (1 Point)               [ ] Arthroscopic surgery                                        (2 Points)  [ ] Central venous access                                      (2 Points)                [ ] General surgery lasting more than 45 minutes (2 points)  [ ] Malignancy- Present or previous                   (2 Points)                [ ] Elective arthroplasty                                         (5 points)    [ ] Stroke (in the previous month)                          (5 Points)                                                                                                                                                           HEMATOLOGY RELATED FACTORS                                                 TRAUMA RELATED RISK FACTORS  [ ] Prior episodes of VTE                                     (3 Points)                [ ] Fracture of the hip, pelvis, or leg                       (5 Points)  [ ] Positive family history for VTE                         (3 Points)             [ ] Acute spinal cord injury (in the previous month)  (5 Points)  [ ] Prothrombin 99189 A                                     (3 Points)               [ ] Paralysis  (less than 1 month)                             (5 Points)  [ ] Factor V Leiden                                             (3 Points)                  [ ] Multiple Trauma within 1 month                        (5 Points)  [ ] Lupus anticoagulants                                     (3 Points)                                                           [ ] Anticardiolipin antibodies                               (3 Points)                                                       [ ] High homocysteine in the blood                      (3 Points)                                             [ ] Other congenital or acquired thrombophilia      (3 Points)                                                [ ] Heparin induced thrombocytopenia                  (3 Points)                                     Total Score [          ]      OPIOID RISK TOOL    AUGUSTINE EACH BOX THAT APPLIES AND ADD TOTALS AT THE END    FAMILY HISTORY OF SUBSTANCE ABUSE                 FEMALE         MALE                                                Alcohol                            [    ] 1 pt         [     ] 3pts                                               Illegal Drugs                    [    ] 2 pts       [     ] 3pts                                               Rx Drugs                          [      ]4 pts      [     ] 4 pts    PERSONAL HISTORY OF SUBSTANCE ABUSE                                                                                          Alcohol                            [     ] 3 pts       [     ] 3 pts                                               Illegal Drugs                    [     ] 4 pts       [     ] 4 pts                                               Rx Drugs                          [     ] 5 pts       [     ] 5 pts    AGE BETWEEN 16-45 YEARS                                     [     ] 1 pt         [     ] 1 pt    HISTORY OF PREADOLESCENT   SEXUAL ABUSE                                                            [     ] 3 pts        [     ] 0pts    PSYCHOLOGICAL DISEASE                     ADD, OCD, Bipolar, Schizophrenia        [     ] 2 pts        [     ] 2 pts                      Depression                                               [     ] 1 pt          [     ] 1 pt           SCORING TOTAL   (add numbers and type here)              (      )                                     A score of 3 or lower indicated LOW risk for future opioid abuse  A score of 4 to 7 indicated moderate risk for future opioid abuse  A score of 8 or higher indicates a high risk for opioid abuse CAPRINI VTE 2.0 SCORE [CLOT updated 2019]    AGE RELATED RISK FACTORS                                                       MOBILITY RELATED FACTORS  [x ] Age 41-60 years                                            (1 Point)                    [ ] Bed rest                                                        (1 Point)  [ ] Age: 61-74 years                                           (2 Points)                  [ ] Plaster cast                                                   (2 Points)  [ ] Age= 75 years                                              (3 Points)                    [ ] Bed bound for more than 72 hours                 (2 Points)    DISEASE RELATED RISK FACTORS                                               GENDER SPECIFIC FACTORS  [ ] Edema in the lower extremities                       (1 Point)              [ ] Pregnancy                                                     (1 Point)  [ ] Varicose veins                                               (1 Point)                     [ ] Post-partum < 6 weeks                                   (1 Point)             [ x] BMI > 25 Kg/m2                                            (1 Point)                     [ ] Hormonal therapy  or oral contraception          (1 Point)                 [ ] Sepsis (in the previous month)                        (1 Point)               [ ] History of pregnancy complications                 (1 point)  [ ] Pneumonia or serious lung disease                                               [ ] Unexplained or recurrent                     (1 Point)           (in the previous month)                               (1 Point)  [ ] Abnormal pulmonary function test                     (1 Point)                 SURGERY RELATED RISK FACTORS  [ ] Acute myocardial infarction                              (1 Point)               [ ]  Section                                             (1 Point)  [ ] Congestive heart failure (in the previous month)  (1 Point)      [ ] Minor surgery                                                  (1 Point)   [ ] Inflammatory bowel disease                             (1 Point)               [ ] Arthroscopic surgery                                        (2 Points)  [ ] Central venous access                                      (2 Points)                [x ] General surgery lasting more than 45 minutes (2 points)  [ ] Malignancy- Present or previous                   (2 Points)                [ ] Elective arthroplasty                                         (5 points)    [ ] Stroke (in the previous month)                          (5 Points)                                                                                                                                                           HEMATOLOGY RELATED FACTORS                                                 TRAUMA RELATED RISK FACTORS  [ ] Prior episodes of VTE                                     (3 Points)                [ ] Fracture of the hip, pelvis, or leg                       (5 Points)  [ ] Positive family history for VTE                         (3 Points)             [ ] Acute spinal cord injury (in the previous month)  (5 Points)  [ ] Prothrombin 72290 A                                     (3 Points)               [ ] Paralysis  (less than 1 month)                             (5 Points)  [ ] Factor V Leiden                                             (3 Points)                  [ ] Multiple Trauma within 1 month                        (5 Points)  [ ] Lupus anticoagulants                                     (3 Points)                                                           [ ] Anticardiolipin antibodies                               (3 Points)                                                       [ ] High homocysteine in the blood                      (3 Points)                                             [ ] Other congenital or acquired thrombophilia      (3 Points)                                                [ ] Heparin induced thrombocytopenia                  (3 Points)                                     Total Score [    4      ]      OPIOID RISK TOOL    AUGUSTINE EACH BOX THAT APPLIES AND ADD TOTALS AT THE END    FAMILY HISTORY OF SUBSTANCE ABUSE                 FEMALE         MALE                                                Alcohol                            [    ] 1 pt         [  x   ] 3pts                                               Illegal Drugs                    [    ] 2 pts       [     ] 3pts                                               Rx Drugs                          [      ]4 pts      [     ] 4 pts    PERSONAL HISTORY OF SUBSTANCE ABUSE                                                                                          Alcohol                            [     ] 3 pts       [     ] 3 pts                                               Illegal Drugs                    [     ] 4 pts       [     ] 4 pts                                               Rx Drugs                          [     ] 5 pts       [     ] 5 pts    AGE BETWEEN 16-45 YEARS                                     [     ] 1 pt         [     ] 1 pt    HISTORY OF PREADOLESCENT   SEXUAL ABUSE                                                            [     ] 3 pts        [     ] 0pts    PSYCHOLOGICAL DISEASE                     ADD, OCD, Bipolar, Schizophrenia        [     ] 2 pts        [     ] 2 pts                      Depression                                               [     ] 1 pt          [     ] 1 pt           SCORING TOTAL   (add numbers and type here)              (  3    )                                     A score of 3 or lower indicated LOW risk for future opioid abuse  A score of 4 to 7 indicated moderate risk for future opioid abuse  A score of 8 or higher indicates a high risk for opioid abuse

## 2019-04-09 NOTE — H&P PST ADULT - NSICDXPASTMEDICALHX_GEN_ALL_CORE_FT
PAST MEDICAL HISTORY:  Hypertension PAST MEDICAL HISTORY:  Hypertension     Risk factors for obstructive sleep apnea

## 2019-04-09 NOTE — H&P PST ADULT - RS GEN PE MLT RESP DETAILS PC
airway patent/diminished breath sounds, L/diminished breath sounds, R/normal/respirations non-labored

## 2019-04-09 NOTE — H&P PST ADULT - HISTORY OF PRESENT ILLNESS
This is a 48 y.o male who presents to PST today. This is a 48 y.o male who presents to PST today.  The pt reports a several week duration of pain to his left knee sustained while bowling. He is scheduled to undergo a left knee arthroscopy in the near future.

## 2019-04-09 NOTE — H&P PST ADULT - NSICDXPROBLEM_GEN_ALL_CORE_FT
PROBLEM DIAGNOSES  Problem: Complex tear of medial meniscus, current injury, left knee, initial encounter  Assessment and Plan:     Problem: Need for prophylactic measure  Assessment and Plan:     Problem: Screening for substance abuse  Assessment and Plan: PROBLEM DIAGNOSES  Problem: Hypertension  Assessment and Plan: Follows with PMD    Problem: Complex tear of medial meniscus, current injury, left knee, initial encounter  Assessment and Plan:     Problem: Need for prophylactic measure  Assessment and Plan: Moderate risk.  Surgical Team to evaluate need for Pharmacologic VTE Prophylaxis    Problem: Screening for substance abuse  Assessment and Plan: Opioid screening tool score =3.  Low risk for potential future opioid abuse

## 2019-04-09 NOTE — H&P PST ADULT - NSICDXFAMILYHX_GEN_ALL_CORE_FT
FAMILY HISTORY:  No pertinent family history in first degree relatives FAMILY HISTORY:  Mother  Still living? No  Family history of CVA, Age at diagnosis: 41-50

## 2019-04-09 NOTE — H&P PST ADULT - NSICDXPASTSURGICALHX_GEN_ALL_CORE_FT
PAST SURGICAL HISTORY:  S/P right knee arthroscopy 2007 PAST SURGICAL HISTORY:  S/P knee replacement Right    S/P right knee arthroscopy 2007

## 2019-04-14 ENCOUNTER — TRANSCRIPTION ENCOUNTER (OUTPATIENT)
Age: 49
End: 2019-04-14

## 2019-04-15 ENCOUNTER — OUTPATIENT (OUTPATIENT)
Dept: INPATIENT UNIT | Facility: HOSPITAL | Age: 49
LOS: 1 days | End: 2019-04-15
Payer: COMMERCIAL

## 2019-04-15 ENCOUNTER — APPOINTMENT (OUTPATIENT)
Dept: ORTHOPEDIC SURGERY | Facility: HOSPITAL | Age: 49
End: 2019-04-15

## 2019-04-15 VITALS
OXYGEN SATURATION: 95 % | SYSTOLIC BLOOD PRESSURE: 117 MMHG | RESPIRATION RATE: 19 BRPM | TEMPERATURE: 98 F | DIASTOLIC BLOOD PRESSURE: 80 MMHG | HEART RATE: 78 BPM

## 2019-04-15 VITALS — HEIGHT: 70 IN | WEIGHT: 235.01 LBS

## 2019-04-15 DIAGNOSIS — Z96.659 PRESENCE OF UNSPECIFIED ARTIFICIAL KNEE JOINT: Chronic | ICD-10-CM

## 2019-04-15 DIAGNOSIS — Z98.890 OTHER SPECIFIED POSTPROCEDURAL STATES: Chronic | ICD-10-CM

## 2019-04-15 DIAGNOSIS — S83.232A COMPLEX TEAR OF MEDIAL MENISCUS, CURRENT INJURY, LEFT KNEE, INITIAL ENCOUNTER: ICD-10-CM

## 2019-04-15 PROCEDURE — ZZZZZ: CPT

## 2019-04-15 PROCEDURE — 97163 PT EVAL HIGH COMPLEX 45 MIN: CPT

## 2019-04-15 PROCEDURE — 29881 ARTHRS KNE SRG MNISECTMY M/L: CPT | Mod: LT

## 2019-04-15 RX ORDER — ONDANSETRON 8 MG/1
4 TABLET, FILM COATED ORAL ONCE
Qty: 0 | Refills: 0 | Status: DISCONTINUED | OUTPATIENT
Start: 2019-04-15 | End: 2019-04-15

## 2019-04-15 RX ORDER — FENTANYL CITRATE 50 UG/ML
25 INJECTION INTRAVENOUS
Qty: 0 | Refills: 0 | Status: DISCONTINUED | OUTPATIENT
Start: 2019-04-15 | End: 2019-04-15

## 2019-04-15 RX ORDER — SODIUM CHLORIDE 9 MG/ML
1000 INJECTION, SOLUTION INTRAVENOUS
Qty: 0 | Refills: 0 | Status: DISCONTINUED | OUTPATIENT
Start: 2019-04-15 | End: 2019-04-15

## 2019-04-15 RX ORDER — NIFEDIPINE 30 MG
1 TABLET, EXTENDED RELEASE 24 HR ORAL
Qty: 0 | Refills: 0 | COMMUNITY

## 2019-04-15 RX ORDER — HYDROCODONE BITARTRATE AND ACETAMINOPHEN 7.5; 325 MG/15ML; MG/15ML
1 SOLUTION ORAL
Qty: 20 | Refills: 0
Start: 2019-04-15 | End: 2019-04-19

## 2019-04-15 RX ORDER — DOXAZOSIN MESYLATE 4 MG
1 TABLET ORAL
Qty: 0 | Refills: 0 | COMMUNITY

## 2019-04-15 RX ORDER — CEFAZOLIN SODIUM 1 G
2000 VIAL (EA) INJECTION ONCE
Qty: 0 | Refills: 0 | Status: DISCONTINUED | OUTPATIENT
Start: 2019-04-15 | End: 2019-04-15

## 2019-04-15 RX ORDER — NABUMETONE 750 MG
1 TABLET ORAL
Qty: 20 | Refills: 0
Start: 2019-04-15 | End: 2019-04-24

## 2019-04-15 RX ORDER — SODIUM CHLORIDE 9 MG/ML
3 INJECTION INTRAMUSCULAR; INTRAVENOUS; SUBCUTANEOUS ONCE
Qty: 0 | Refills: 0 | Status: DISCONTINUED | OUTPATIENT
Start: 2019-04-15 | End: 2019-04-15

## 2019-04-15 NOTE — BRIEF OPERATIVE NOTE - NSICDXBRIEFPOSTOP_GEN_ALL_CORE_FT
POST-OP DIAGNOSIS:  Left knee DJD 15-Apr-2019 09:34:07  Kvng Brooks  Acute medial meniscal tear 15-Apr-2019 09:33:47  Kvng Brooks

## 2019-04-15 NOTE — ASU DISCHARGE PLAN (ADULT/PEDIATRIC) - CARE PROVIDER_API CALL
Jerry Espinosa)  Orthopaedic Surgery  [address] [place] [address]  [phone]  Fax: [phone]  Follow Up Time:

## 2019-04-15 NOTE — PHYSICAL THERAPY INITIAL EVALUATION ADULT - ACTIVE RANGE OF MOTION EXAMINATION, REHAB EVAL
bilateral  lower extremity Active ROM was WFL (within functional limits)/deficits as listed below/L knee flexion to 100deg/bilateral upper extremity Active ROM was WFL (within functional limits)

## 2019-04-17 PROBLEM — Z91.89 OTHER SPECIFIED PERSONAL RISK FACTORS, NOT ELSEWHERE CLASSIFIED: Chronic | Status: ACTIVE | Noted: 2019-04-09

## 2019-04-24 ENCOUNTER — APPOINTMENT (OUTPATIENT)
Dept: ORTHOPEDIC SURGERY | Facility: CLINIC | Age: 49
End: 2019-04-24
Payer: COMMERCIAL

## 2019-04-24 DIAGNOSIS — M23.92 UNSPECIFIED INTERNAL DERANGEMENT OF LEFT KNEE: ICD-10-CM

## 2019-04-24 DIAGNOSIS — Z96.651 AFTERCARE FOLLOWING JOINT REPLACEMENT SURGERY: ICD-10-CM

## 2019-04-24 DIAGNOSIS — Z47.1 AFTERCARE FOLLOWING JOINT REPLACEMENT SURGERY: ICD-10-CM

## 2019-04-24 PROCEDURE — 99024 POSTOP FOLLOW-UP VISIT: CPT

## 2019-04-24 NOTE — PROCEDURE
[de-identified] : 49 year old male s/p right knee arthroscopy 4/15/19. Patient is doing well 3 months postop. He will continue with home exercises at this time, and advance activity as tolerated. I recommended he FU with Dermatology if rash persists. Pt will FU in 4 weeks at my new office.

## 2019-04-24 NOTE — HISTORY OF PRESENT ILLNESS
[___ Weeks Post Op] : [unfilled] weeks post op [Clean/Dry/Intact] : clean, dry and intact [Healed] : healed [Neuro Intact] : an unremarkable neurological exam [Vascular Intact] : ~T peripheral vascular exam normal [Doing Well] : is doing well [No Sign of Infection] : is showing no signs of infection [Excellent Pain Control] : has excellent pain control [Chills] : no chills [Constipation] : no constipation [Diarrhea] : no diarrhea [Dysuria] : no dysuria [Fever] : no fever [Nausea] : no nausea [Vomiting] : no vomiting [Erythema] : not erythematous [Discharge] : absent of discharge [Swelling] : not swollen [Dehiscence] : not dehisced [de-identified] : s/p right knee arthroscopy 4/15/19 [de-identified] : 49 year old male s/p right knee arthroscopy 4/15/19. Pt is doing well postop. He is complaining of a rash that developed postop. Pain significantly improved. Ambulating freely.  [de-identified] : Right knee, FROM hip, mild effusion, portal incisions well healed, fine generalized rash notes, no sign of infection, ROM 0 - 135 , no crepitus, no medial pain, no lateral pain, no Lachman, no pivot shift, no anterior drawer, no posterior drawer, stable, anatomic alignment  [de-identified] :  No new imaging reviewed today.

## 2019-06-04 NOTE — PHYSICAL THERAPY INITIAL EVALUATION ADULT - LEVEL OF INDEPENDENCE: GAIT, REHAB EVAL
Size Of Lesion After Curettage: 1.1
Histology Text: Following the procedure a portion of the curetted material was sent for histologic evaluation.
Lab: [id number]
Billing Type: Third-Party Bill
Lab Facility: [id number]
Biopsy Type: H and E
Detail Level: Detailed
Anesthesia Type: 1% lidocaine with epinephrine
Bill As A Line Item Or As Units: Line Item
Bill 32463 For Specimen Handling/Conveyance To Laboratory?: no
Additional Information: (Optional): The wound was cleaned, and a pressure dressing was applied.  The patient received detailed post-op instructions.
Consent was obtained from the patient. The risks, benefits and alternatives to therapy were discussed in detail. Specifically, the risks of infection, scarring, bleeding, prolonged wound healing, nerve injury, incomplete removal, allergy to anesthesia and recurrence were addressed. Alternatives to ED&C, such as: surgical removal and XRT were also discussed.  Prior to the procedure, the treatment site was clearly identified and confirmed by the patient. All components of Universal Protocol/PAUSE Rule completed.
Cautery Type: electrodesiccation
What Was Performed First?: Curettage
Post-Care Instructions: I reviewed with the patient in detail post-care instructions. Patient is to keep the area dry for 48 hours, and not to engage in any swimming until the area is healed. Should the patient develop any fevers, chills, bleeding, severe pain patient will contact the office immediately.
independent

## 2022-07-25 NOTE — ASU PREOP CHECKLIST - CHLOROHEXIDINE WASH IN ASU
Quality 130: Documentation Of Current Medications In The Medical Record: Current Medications Documented Detail Level: Detailed 15-Apr-2019 07:06

## 2023-06-05 NOTE — PHYSICAL THERAPY INITIAL EVALUATION ADULT - ASR EQUIP NEEDS DISCH PT EVAL
Pt called to schedule NP appt for red bump between eyebrows  Returned call and advised that we are booking out towards the end of the year for new patients  Pt will contact PCP to try and get an appt there  pt owns a cane

## 2023-09-15 ASSESSMENT — KOOS JR
TWISING OR PIVOTING ON KNEE: MODERATE
RISING FROM SITTING: MILD
KOOS JR RAW SCORE: 6
KOOS JR RAW SCORE: 15
IMPORTED FORM: YES
HOW SEVERE IS YOUR KNEE STIFFNESS AFTER FIRST WAKING IN MORNING: MILD
IMPORTED KOOS JR SCORE: 61.58
RISING FROM SITTING: MODERATE
TWISING OR PIVOTING ON KNEE: MODERATE
BENDING TO THE FLOOR TO PICK UP OBJECT: MILD
GOING UP OR DOWN STAIRS: MODERATE
KOOS JR RAW SCORE: 10
IMPORTED KOOS JR SCORE: 61.58
HOW SEVERE IS YOUR KNEE STIFFNESS AFTER FIRST WAKING IN MORNING: MODERATE
IMPORTED KOOS JR SCORE: 50.01
RISING FROM SITTING: MILD
BENDING TO THE FLOOR TO PICK UP OBJECT: MODERATE
KOOS JR RAW SCORE: 15
HOW SEVERE IS YOUR KNEE STIFFNESS AFTER FIRST WAKING IN MORNING: MILD
GOING UP OR DOWN STAIRS: SEVERE
RISING FROM SITTING: MODERATE
BENDING TO THE FLOOR TO PICK UP OBJECT: MODERATE
IMPORTED LATERALITY: RIGHT
IMPORTED LATERALITY: RIGHT
STANDING UPRIGHT: MODERATE
STANDING UPRIGHT: MODERATE
STRAIGHTENING KNEE FULLY: MILD
TWISING OR PIVOTING ON KNEE: MILD
IMPORTED KOOS JR SCORE: 70.7
BENDING TO THE FLOOR TO PICK UP OBJECT: MILD
IMPORTED FORM: YES
IMPORTED KOOS JR SCORE: 50.01
KOOS JR RAW SCORE: 10
IMPORTED KOOS JR SCORE: 70.7
STANDING UPRIGHT: MILD
GOING UP OR DOWN STAIRS: SEVERE
STRAIGHTENING KNEE FULLY: MODERATE
HOW SEVERE IS YOUR KNEE STIFFNESS AFTER FIRST WAKING IN MORNING: MODERATE
KOOS JR RAW SCORE: 6
TWISING OR PIVOTING ON KNEE: MILD
GOING UP OR DOWN STAIRS: MODERATE
STRAIGHTENING KNEE FULLY: MODERATE
STRAIGHTENING KNEE FULLY: MILD
STANDING UPRIGHT: MILD

## 2023-11-18 ENCOUNTER — INPATIENT (INPATIENT)
Facility: HOSPITAL | Age: 53
LOS: 0 days | Discharge: HOME | DRG: 309 | End: 2023-11-19
Attending: FAMILY MEDICINE | Admitting: STUDENT IN AN ORGANIZED HEALTH CARE EDUCATION/TRAINING PROGRAM
Payer: COMMERCIAL

## 2023-11-18 VITALS
WEIGHT: 251.99 LBS | TEMPERATURE: 98 F | RESPIRATION RATE: 20 BRPM | OXYGEN SATURATION: 96 % | HEART RATE: 134 BPM | SYSTOLIC BLOOD PRESSURE: 140 MMHG | DIASTOLIC BLOOD PRESSURE: 86 MMHG | HEIGHT: 70 IN

## 2023-11-18 DIAGNOSIS — Z98.890 OTHER SPECIFIED POSTPROCEDURAL STATES: Chronic | ICD-10-CM

## 2023-11-18 DIAGNOSIS — I48.91 UNSPECIFIED ATRIAL FIBRILLATION: ICD-10-CM

## 2023-11-18 DIAGNOSIS — Z96.659 PRESENCE OF UNSPECIFIED ARTIFICIAL KNEE JOINT: Chronic | ICD-10-CM

## 2023-11-18 LAB
ALBUMIN SERPL ELPH-MCNC: 4.3 G/DL — SIGNIFICANT CHANGE UP (ref 3.3–5.2)
ALBUMIN SERPL ELPH-MCNC: 4.3 G/DL — SIGNIFICANT CHANGE UP (ref 3.3–5.2)
ALP SERPL-CCNC: 96 U/L — SIGNIFICANT CHANGE UP (ref 40–120)
ALP SERPL-CCNC: 96 U/L — SIGNIFICANT CHANGE UP (ref 40–120)
ALT FLD-CCNC: 19 U/L — SIGNIFICANT CHANGE UP
ALT FLD-CCNC: 19 U/L — SIGNIFICANT CHANGE UP
ANION GAP SERPL CALC-SCNC: 12 MMOL/L — SIGNIFICANT CHANGE UP (ref 5–17)
ANION GAP SERPL CALC-SCNC: 12 MMOL/L — SIGNIFICANT CHANGE UP (ref 5–17)
ANION GAP SERPL CALC-SCNC: 8 MMOL/L — SIGNIFICANT CHANGE UP (ref 5–17)
ANION GAP SERPL CALC-SCNC: 8 MMOL/L — SIGNIFICANT CHANGE UP (ref 5–17)
AST SERPL-CCNC: 22 U/L — SIGNIFICANT CHANGE UP
AST SERPL-CCNC: 22 U/L — SIGNIFICANT CHANGE UP
BASOPHILS # BLD AUTO: 0.15 K/UL — SIGNIFICANT CHANGE UP (ref 0–0.2)
BASOPHILS # BLD AUTO: 0.15 K/UL — SIGNIFICANT CHANGE UP (ref 0–0.2)
BASOPHILS NFR BLD AUTO: 1.4 % — SIGNIFICANT CHANGE UP (ref 0–2)
BASOPHILS NFR BLD AUTO: 1.4 % — SIGNIFICANT CHANGE UP (ref 0–2)
BILIRUB SERPL-MCNC: 0.3 MG/DL — LOW (ref 0.4–2)
BILIRUB SERPL-MCNC: 0.3 MG/DL — LOW (ref 0.4–2)
BUN SERPL-MCNC: 20.3 MG/DL — HIGH (ref 8–20)
BUN SERPL-MCNC: 20.3 MG/DL — HIGH (ref 8–20)
BUN SERPL-MCNC: 22.8 MG/DL — HIGH (ref 8–20)
BUN SERPL-MCNC: 22.8 MG/DL — HIGH (ref 8–20)
CALCIUM SERPL-MCNC: 8 MG/DL — LOW (ref 8.4–10.5)
CALCIUM SERPL-MCNC: 8 MG/DL — LOW (ref 8.4–10.5)
CALCIUM SERPL-MCNC: 9.3 MG/DL — SIGNIFICANT CHANGE UP (ref 8.4–10.5)
CALCIUM SERPL-MCNC: 9.3 MG/DL — SIGNIFICANT CHANGE UP (ref 8.4–10.5)
CHLORIDE SERPL-SCNC: 108 MMOL/L — SIGNIFICANT CHANGE UP (ref 96–108)
CHLORIDE SERPL-SCNC: 108 MMOL/L — SIGNIFICANT CHANGE UP (ref 96–108)
CHLORIDE SERPL-SCNC: 110 MMOL/L — HIGH (ref 96–108)
CHLORIDE SERPL-SCNC: 110 MMOL/L — HIGH (ref 96–108)
CK SERPL-CCNC: 138 U/L — SIGNIFICANT CHANGE UP (ref 30–200)
CK SERPL-CCNC: 138 U/L — SIGNIFICANT CHANGE UP (ref 30–200)
CO2 SERPL-SCNC: 26 MMOL/L — SIGNIFICANT CHANGE UP (ref 22–29)
CO2 SERPL-SCNC: 26 MMOL/L — SIGNIFICANT CHANGE UP (ref 22–29)
CO2 SERPL-SCNC: 27 MMOL/L — SIGNIFICANT CHANGE UP (ref 22–29)
CO2 SERPL-SCNC: 27 MMOL/L — SIGNIFICANT CHANGE UP (ref 22–29)
CREAT SERPL-MCNC: 1.2 MG/DL — SIGNIFICANT CHANGE UP (ref 0.5–1.3)
CREAT SERPL-MCNC: 1.2 MG/DL — SIGNIFICANT CHANGE UP (ref 0.5–1.3)
CREAT SERPL-MCNC: 1.31 MG/DL — HIGH (ref 0.5–1.3)
CREAT SERPL-MCNC: 1.31 MG/DL — HIGH (ref 0.5–1.3)
EGFR: 64 ML/MIN/1.73M2 — SIGNIFICANT CHANGE UP
EGFR: 64 ML/MIN/1.73M2 — SIGNIFICANT CHANGE UP
EGFR: 71 ML/MIN/1.73M2 — SIGNIFICANT CHANGE UP
EGFR: 71 ML/MIN/1.73M2 — SIGNIFICANT CHANGE UP
EOSINOPHIL # BLD AUTO: 0.26 K/UL — SIGNIFICANT CHANGE UP (ref 0–0.5)
EOSINOPHIL # BLD AUTO: 0.26 K/UL — SIGNIFICANT CHANGE UP (ref 0–0.5)
EOSINOPHIL NFR BLD AUTO: 2.5 % — SIGNIFICANT CHANGE UP (ref 0–6)
EOSINOPHIL NFR BLD AUTO: 2.5 % — SIGNIFICANT CHANGE UP (ref 0–6)
ETHANOL SERPL-MCNC: <10 MG/DL — SIGNIFICANT CHANGE UP (ref 0–9)
ETHANOL SERPL-MCNC: <10 MG/DL — SIGNIFICANT CHANGE UP (ref 0–9)
GLUCOSE SERPL-MCNC: 128 MG/DL — HIGH (ref 70–99)
GLUCOSE SERPL-MCNC: 128 MG/DL — HIGH (ref 70–99)
GLUCOSE SERPL-MCNC: 97 MG/DL — SIGNIFICANT CHANGE UP (ref 70–99)
GLUCOSE SERPL-MCNC: 97 MG/DL — SIGNIFICANT CHANGE UP (ref 70–99)
HCT VFR BLD CALC: 38.9 % — LOW (ref 39–50)
HCT VFR BLD CALC: 38.9 % — LOW (ref 39–50)
HCT VFR BLD CALC: 43.1 % — SIGNIFICANT CHANGE UP (ref 39–50)
HCT VFR BLD CALC: 43.1 % — SIGNIFICANT CHANGE UP (ref 39–50)
HGB BLD-MCNC: 12.9 G/DL — LOW (ref 13–17)
HGB BLD-MCNC: 12.9 G/DL — LOW (ref 13–17)
HGB BLD-MCNC: 14.9 G/DL — SIGNIFICANT CHANGE UP (ref 13–17)
HGB BLD-MCNC: 14.9 G/DL — SIGNIFICANT CHANGE UP (ref 13–17)
IMM GRANULOCYTES NFR BLD AUTO: 0.5 % — SIGNIFICANT CHANGE UP (ref 0–0.9)
IMM GRANULOCYTES NFR BLD AUTO: 0.5 % — SIGNIFICANT CHANGE UP (ref 0–0.9)
LYMPHOCYTES # BLD AUTO: 2.83 K/UL — SIGNIFICANT CHANGE UP (ref 1–3.3)
LYMPHOCYTES # BLD AUTO: 2.83 K/UL — SIGNIFICANT CHANGE UP (ref 1–3.3)
LYMPHOCYTES # BLD AUTO: 26.7 % — SIGNIFICANT CHANGE UP (ref 13–44)
LYMPHOCYTES # BLD AUTO: 26.7 % — SIGNIFICANT CHANGE UP (ref 13–44)
MAGNESIUM SERPL-MCNC: 2.1 MG/DL — SIGNIFICANT CHANGE UP (ref 1.6–2.6)
MAGNESIUM SERPL-MCNC: 2.1 MG/DL — SIGNIFICANT CHANGE UP (ref 1.6–2.6)
MAGNESIUM SERPL-MCNC: 2.2 MG/DL — SIGNIFICANT CHANGE UP (ref 1.6–2.6)
MAGNESIUM SERPL-MCNC: 2.2 MG/DL — SIGNIFICANT CHANGE UP (ref 1.6–2.6)
MCHC RBC-ENTMCNC: 29.7 PG — SIGNIFICANT CHANGE UP (ref 27–34)
MCHC RBC-ENTMCNC: 29.7 PG — SIGNIFICANT CHANGE UP (ref 27–34)
MCHC RBC-ENTMCNC: 30.6 PG — SIGNIFICANT CHANGE UP (ref 27–34)
MCHC RBC-ENTMCNC: 30.6 PG — SIGNIFICANT CHANGE UP (ref 27–34)
MCHC RBC-ENTMCNC: 33.2 GM/DL — SIGNIFICANT CHANGE UP (ref 32–36)
MCHC RBC-ENTMCNC: 33.2 GM/DL — SIGNIFICANT CHANGE UP (ref 32–36)
MCHC RBC-ENTMCNC: 34.6 GM/DL — SIGNIFICANT CHANGE UP (ref 32–36)
MCHC RBC-ENTMCNC: 34.6 GM/DL — SIGNIFICANT CHANGE UP (ref 32–36)
MCV RBC AUTO: 88.5 FL — SIGNIFICANT CHANGE UP (ref 80–100)
MCV RBC AUTO: 88.5 FL — SIGNIFICANT CHANGE UP (ref 80–100)
MCV RBC AUTO: 89.6 FL — SIGNIFICANT CHANGE UP (ref 80–100)
MCV RBC AUTO: 89.6 FL — SIGNIFICANT CHANGE UP (ref 80–100)
MONOCYTES # BLD AUTO: 1.07 K/UL — HIGH (ref 0–0.9)
MONOCYTES # BLD AUTO: 1.07 K/UL — HIGH (ref 0–0.9)
MONOCYTES NFR BLD AUTO: 10.1 % — SIGNIFICANT CHANGE UP (ref 2–14)
MONOCYTES NFR BLD AUTO: 10.1 % — SIGNIFICANT CHANGE UP (ref 2–14)
NEUTROPHILS # BLD AUTO: 6.22 K/UL — SIGNIFICANT CHANGE UP (ref 1.8–7.4)
NEUTROPHILS # BLD AUTO: 6.22 K/UL — SIGNIFICANT CHANGE UP (ref 1.8–7.4)
NEUTROPHILS NFR BLD AUTO: 58.8 % — SIGNIFICANT CHANGE UP (ref 43–77)
NEUTROPHILS NFR BLD AUTO: 58.8 % — SIGNIFICANT CHANGE UP (ref 43–77)
PHOSPHATE SERPL-MCNC: 3.2 MG/DL — SIGNIFICANT CHANGE UP (ref 2.4–4.7)
PHOSPHATE SERPL-MCNC: 3.2 MG/DL — SIGNIFICANT CHANGE UP (ref 2.4–4.7)
PLATELET # BLD AUTO: 196 K/UL — SIGNIFICANT CHANGE UP (ref 150–400)
PLATELET # BLD AUTO: 196 K/UL — SIGNIFICANT CHANGE UP (ref 150–400)
PLATELET # BLD AUTO: 242 K/UL — SIGNIFICANT CHANGE UP (ref 150–400)
PLATELET # BLD AUTO: 242 K/UL — SIGNIFICANT CHANGE UP (ref 150–400)
POTASSIUM SERPL-MCNC: 3.6 MMOL/L — SIGNIFICANT CHANGE UP (ref 3.5–5.3)
POTASSIUM SERPL-MCNC: 3.6 MMOL/L — SIGNIFICANT CHANGE UP (ref 3.5–5.3)
POTASSIUM SERPL-MCNC: 4 MMOL/L — SIGNIFICANT CHANGE UP (ref 3.5–5.3)
POTASSIUM SERPL-MCNC: 4 MMOL/L — SIGNIFICANT CHANGE UP (ref 3.5–5.3)
POTASSIUM SERPL-SCNC: 3.6 MMOL/L — SIGNIFICANT CHANGE UP (ref 3.5–5.3)
POTASSIUM SERPL-SCNC: 3.6 MMOL/L — SIGNIFICANT CHANGE UP (ref 3.5–5.3)
POTASSIUM SERPL-SCNC: 4 MMOL/L — SIGNIFICANT CHANGE UP (ref 3.5–5.3)
POTASSIUM SERPL-SCNC: 4 MMOL/L — SIGNIFICANT CHANGE UP (ref 3.5–5.3)
PROT SERPL-MCNC: 7 G/DL — SIGNIFICANT CHANGE UP (ref 6.6–8.7)
PROT SERPL-MCNC: 7 G/DL — SIGNIFICANT CHANGE UP (ref 6.6–8.7)
RBC # BLD: 4.34 M/UL — SIGNIFICANT CHANGE UP (ref 4.2–5.8)
RBC # BLD: 4.34 M/UL — SIGNIFICANT CHANGE UP (ref 4.2–5.8)
RBC # BLD: 4.87 M/UL — SIGNIFICANT CHANGE UP (ref 4.2–5.8)
RBC # BLD: 4.87 M/UL — SIGNIFICANT CHANGE UP (ref 4.2–5.8)
RBC # FLD: 12.9 % — SIGNIFICANT CHANGE UP (ref 10.3–14.5)
RBC # FLD: 12.9 % — SIGNIFICANT CHANGE UP (ref 10.3–14.5)
RBC # FLD: 13.1 % — SIGNIFICANT CHANGE UP (ref 10.3–14.5)
RBC # FLD: 13.1 % — SIGNIFICANT CHANGE UP (ref 10.3–14.5)
SODIUM SERPL-SCNC: 144 MMOL/L — SIGNIFICANT CHANGE UP (ref 135–145)
SODIUM SERPL-SCNC: 144 MMOL/L — SIGNIFICANT CHANGE UP (ref 135–145)
SODIUM SERPL-SCNC: 147 MMOL/L — HIGH (ref 135–145)
SODIUM SERPL-SCNC: 147 MMOL/L — HIGH (ref 135–145)
T3 SERPL-MCNC: 128 NG/DL — SIGNIFICANT CHANGE UP (ref 80–200)
T3 SERPL-MCNC: 128 NG/DL — SIGNIFICANT CHANGE UP (ref 80–200)
T4 FREE SERPL-MCNC: 1.3 NG/DL — SIGNIFICANT CHANGE UP (ref 0.9–1.8)
T4 FREE SERPL-MCNC: 1.3 NG/DL — SIGNIFICANT CHANGE UP (ref 0.9–1.8)
TROPONIN T, HIGH SENSITIVITY RESULT: 10 NG/L — SIGNIFICANT CHANGE UP (ref 0–51)
TROPONIN T, HIGH SENSITIVITY RESULT: 10 NG/L — SIGNIFICANT CHANGE UP (ref 0–51)
TROPONIN T, HIGH SENSITIVITY RESULT: 22 NG/L — SIGNIFICANT CHANGE UP (ref 0–51)
TROPONIN T, HIGH SENSITIVITY RESULT: 22 NG/L — SIGNIFICANT CHANGE UP (ref 0–51)
TSH SERPL-MCNC: 4.94 UIU/ML — HIGH (ref 0.27–4.2)
TSH SERPL-MCNC: 4.94 UIU/ML — HIGH (ref 0.27–4.2)
TSH SERPL-MCNC: 7.66 UIU/ML — HIGH (ref 0.27–4.2)
TSH SERPL-MCNC: 7.66 UIU/ML — HIGH (ref 0.27–4.2)
WBC # BLD: 10.58 K/UL — HIGH (ref 3.8–10.5)
WBC # BLD: 10.58 K/UL — HIGH (ref 3.8–10.5)
WBC # BLD: 7.58 K/UL — SIGNIFICANT CHANGE UP (ref 3.8–10.5)
WBC # BLD: 7.58 K/UL — SIGNIFICANT CHANGE UP (ref 3.8–10.5)
WBC # FLD AUTO: 10.58 K/UL — HIGH (ref 3.8–10.5)
WBC # FLD AUTO: 10.58 K/UL — HIGH (ref 3.8–10.5)
WBC # FLD AUTO: 7.58 K/UL — SIGNIFICANT CHANGE UP (ref 3.8–10.5)
WBC # FLD AUTO: 7.58 K/UL — SIGNIFICANT CHANGE UP (ref 3.8–10.5)

## 2023-11-18 PROCEDURE — 93010 ELECTROCARDIOGRAM REPORT: CPT

## 2023-11-18 PROCEDURE — 99291 CRITICAL CARE FIRST HOUR: CPT

## 2023-11-18 PROCEDURE — 71045 X-RAY EXAM CHEST 1 VIEW: CPT | Mod: 26

## 2023-11-18 PROCEDURE — 99223 1ST HOSP IP/OBS HIGH 75: CPT

## 2023-11-18 PROCEDURE — 76775 US EXAM ABDO BACK WALL LIM: CPT | Mod: 26

## 2023-11-18 RX ORDER — ONDANSETRON 8 MG/1
4 TABLET, FILM COATED ORAL EVERY 8 HOURS
Refills: 0 | Status: DISCONTINUED | OUTPATIENT
Start: 2023-11-18 | End: 2023-11-19

## 2023-11-18 RX ORDER — HEPARIN SODIUM 5000 [USP'U]/ML
5000 INJECTION INTRAVENOUS; SUBCUTANEOUS EVERY 8 HOURS
Refills: 0 | Status: DISCONTINUED | OUTPATIENT
Start: 2023-11-18 | End: 2023-11-19

## 2023-11-18 RX ORDER — ACETAMINOPHEN 500 MG
650 TABLET ORAL EVERY 6 HOURS
Refills: 0 | Status: DISCONTINUED | OUTPATIENT
Start: 2023-11-18 | End: 2023-11-19

## 2023-11-18 RX ORDER — DILTIAZEM HCL 120 MG
30 CAPSULE, EXT RELEASE 24 HR ORAL ONCE
Refills: 0 | Status: COMPLETED | OUTPATIENT
Start: 2023-11-18 | End: 2023-11-18

## 2023-11-18 RX ORDER — INFLUENZA VIRUS VACCINE 15; 15; 15; 15 UG/.5ML; UG/.5ML; UG/.5ML; UG/.5ML
0.5 SUSPENSION INTRAMUSCULAR ONCE
Refills: 0 | Status: DISCONTINUED | OUTPATIENT
Start: 2023-11-18 | End: 2023-11-19

## 2023-11-18 RX ORDER — METOPROLOL TARTRATE 50 MG
25 TABLET ORAL THREE TIMES A DAY
Refills: 0 | Status: DISCONTINUED | OUTPATIENT
Start: 2023-11-18 | End: 2023-11-19

## 2023-11-18 RX ORDER — DOXAZOSIN MESYLATE 4 MG
4 TABLET ORAL DAILY
Refills: 0 | Status: DISCONTINUED | OUTPATIENT
Start: 2023-11-18 | End: 2023-11-19

## 2023-11-18 RX ORDER — POTASSIUM CHLORIDE 20 MEQ
20 PACKET (EA) ORAL ONCE
Refills: 0 | Status: COMPLETED | OUTPATIENT
Start: 2023-11-18 | End: 2023-11-18

## 2023-11-18 RX ORDER — METOPROLOL TARTRATE 50 MG
5 TABLET ORAL ONCE
Refills: 0 | Status: COMPLETED | OUTPATIENT
Start: 2023-11-18 | End: 2023-11-18

## 2023-11-18 RX ORDER — ASPIRIN/CALCIUM CARB/MAGNESIUM 324 MG
324 TABLET ORAL ONCE
Refills: 0 | Status: COMPLETED | OUTPATIENT
Start: 2023-11-18 | End: 2023-11-18

## 2023-11-18 RX ORDER — DILTIAZEM HCL 120 MG
25 CAPSULE, EXT RELEASE 24 HR ORAL ONCE
Refills: 0 | Status: COMPLETED | OUTPATIENT
Start: 2023-11-18 | End: 2023-11-18

## 2023-11-18 RX ORDER — ASPIRIN/CALCIUM CARB/MAGNESIUM 324 MG
81 TABLET ORAL DAILY
Refills: 0 | Status: DISCONTINUED | OUTPATIENT
Start: 2023-11-19 | End: 2023-11-19

## 2023-11-18 RX ORDER — LANOLIN ALCOHOL/MO/W.PET/CERES
3 CREAM (GRAM) TOPICAL AT BEDTIME
Refills: 0 | Status: DISCONTINUED | OUTPATIENT
Start: 2023-11-18 | End: 2023-11-19

## 2023-11-18 RX ORDER — METOPROLOL TARTRATE 50 MG
25 TABLET ORAL EVERY 12 HOURS
Refills: 0 | Status: DISCONTINUED | OUTPATIENT
Start: 2023-11-18 | End: 2023-11-18

## 2023-11-18 RX ORDER — METOPROLOL TARTRATE 50 MG
5 TABLET ORAL EVERY 6 HOURS
Refills: 0 | Status: DISCONTINUED | OUTPATIENT
Start: 2023-11-18 | End: 2023-11-19

## 2023-11-18 RX ORDER — SODIUM CHLORIDE 9 MG/ML
1000 INJECTION INTRAMUSCULAR; INTRAVENOUS; SUBCUTANEOUS ONCE
Refills: 0 | Status: COMPLETED | OUTPATIENT
Start: 2023-11-18 | End: 2023-11-18

## 2023-11-18 RX ADMIN — Medication 30 MILLIGRAM(S): at 05:36

## 2023-11-18 RX ADMIN — Medication 5 MILLIGRAM(S): at 04:19

## 2023-11-18 RX ADMIN — Medication 5 MILLIGRAM(S): at 04:13

## 2023-11-18 RX ADMIN — HEPARIN SODIUM 5000 UNIT(S): 5000 INJECTION INTRAVENOUS; SUBCUTANEOUS at 21:04

## 2023-11-18 RX ADMIN — SODIUM CHLORIDE 1000 MILLILITER(S): 9 INJECTION INTRAMUSCULAR; INTRAVENOUS; SUBCUTANEOUS at 04:19

## 2023-11-18 RX ADMIN — Medication 324 MILLIGRAM(S): at 08:15

## 2023-11-18 RX ADMIN — Medication 25 MILLIGRAM(S): at 05:15

## 2023-11-18 RX ADMIN — Medication 25 MILLIGRAM(S): at 13:17

## 2023-11-18 RX ADMIN — Medication 25 MILLIGRAM(S): at 21:04

## 2023-11-18 RX ADMIN — Medication 20 MILLIEQUIVALENT(S): at 06:33

## 2023-11-18 RX ADMIN — Medication 5 MILLIGRAM(S): at 20:26

## 2023-11-18 RX ADMIN — HEPARIN SODIUM 5000 UNIT(S): 5000 INJECTION INTRAVENOUS; SUBCUTANEOUS at 13:17

## 2023-11-18 NOTE — H&P ADULT - HISTORY OF PRESENT ILLNESS
53M with PMHX HTN presents to [facility] ER with acute onset chest pressure/palpitations and tachycardia noted by his smart watch as he was going to bed after bowling. Patient arrives tachycardic -160. EKG AFIB RVR. Given Lopressor and Cardizem with improvement. Denies prior hx similar episodes. No current CP/palpitations. Denies SOB. He did admit to a few drinks with his friends while bowling but denies ETOH Abuse and ETOH level is negative in ED. No other complaints. Discussed with wife at bedside. Med rec confirmed patient has been on same meds for 30 years for difficult to control HTN dx Age 16.     ROS negative unless mentioned.     PMHX: HTN  PSHX: R TKR, R Knee Arthoscopy  FAMHX: Mother +CVA 50s  Social Hx: Denies etoh/tobacco/drug abuse  NKDA

## 2023-11-18 NOTE — CHART NOTE - NSCHARTNOTEFT_GEN_A_CORE
pt is seen in am , wife is at the bedside   he is c/o feeling dizzy , no CP or pressure     Vital Signs Last 24 Hrs  T(C): 36.7 (18 Nov 2023 11:50), Max: 36.8 (18 Nov 2023 03:53)  T(F): 98 (18 Nov 2023 11:50), Max: 98.2 (18 Nov 2023 03:53)  HR: 92 (18 Nov 2023 11:50) (78 - 160)  BP: 132/93 (18 Nov 2023 11:50) (132/93 - 151/74)  BP(mean): --  RR: 18 (18 Nov 2023 11:50) (16 - 20)  SpO2: 98% (18 Nov 2023 11:50) (93% - 98%)    Parameters below as of 18 Nov 2023 11:50  Patient On (Oxygen Delivery Method): room air     53M with PMHX HTN presents to [facility] ER with acute onset chest pressure/palpitations and tachycardia noted by his smart watch as he was going to bed after bowling       1-Chest Pain / New Onset AFIB RVR  cont tele   metoprolol 25 mg change to TID   rate control   cardiology / EP consult appreciated     2-HTN   controlled   cont home meds     d/w wife and pt

## 2023-11-18 NOTE — H&P ADULT - ASSESSMENT
ASSESSMENT:  53M with PMHX HTN presents to [facility] ER with acute onset chest pressure/palpitations and tachycardia noted by his smart watch as he was going to bed after bowling     PLAN:  Chest Pain 2/2 New Onset AFIB RVR  -Admit to Tele  -Monitor VS  -Repeat Labs  -TSH elevated repeat TSH/T3/Free T4  -1L IVFB NSS  -Lopressor 5mg IV x2 + Cardizem 25mg IV x1  -Diltiazem 30mg x1  -Hold Atenolol and Nifedpine  -Start Meprolol Tartrate 25mg BID  -Titrate BB for Goal RVR <110  -Lopressor 5mg IV q6 PRN RVR >110  -Check TTE  -[id number] 1  -ASA 325mg PO x1  -ASA 81mg q24 thereafter  -KCL 20meq PO x1  -Cardiology Consulted ([facility])    HTN, BPH  -Doxazosin 4mg q24  -Hold Atenolol 100mg q24  -Hold Nifedipine 90mg q24   -Hold Lisinopril 10mg q24 for ARF    Elevated TSH r/o Hypothyroidism  -Repeat TSH. Check T3 and Free T4.    ARF v CKD  -Cr 1.31 with unknown baseline  -Trend BMP  -Avoid Nephrotoxins  -1L IVFB NSS hold further IVF  -Trend BMP  -Check UA  -CPK Negative  -Bladder Scan r/o retention  -Check Renal US

## 2023-11-18 NOTE — ED PROVIDER NOTE - OBJECTIVE STATEMENT
53-year-old male with history of hypertension for 30 years presents with sudden onset of palpitation tonight.  Patient says that he just felt uneasy and not well and called ambulance and was found to have rapid A-fib.  Patient has no known history of rapid A-fib.  Patient has no family history of CAD.  Patient drank a few drinks last night last drink was 9 PM.  Patient is non-smoker

## 2023-11-18 NOTE — ED ADULT NURSE NOTE - OBJECTIVE STATEMENT
pt to ED with complaints of chest discomfort and palpitations. hx HTN. pt states he was at the diner tonight, when he left he felt discomfort and fluttering in his chest. pt states the discomfort radiated across chest and down right arm. pt states he checked his apple watch and states the rhythm was unable to be determined. pt presents to  area of ED in rapid afib. pt A+O x3. RR even and unlabored on RA. pt denies any chest pain/sob/n/v/lightheadedness.

## 2023-11-18 NOTE — ED ADULT NURSE NOTE - NSFALLUNIVINTERV_ED_ALL_ED
Bed/Stretcher in lowest position, wheels locked, appropriate side rails in place/Call bell, personal items and telephone in reach/Instruct patient to call for assistance before getting out of bed/chair/stretcher/Non-slip footwear applied when patient is off stretcher/[place] to call system/Physically safe environment - no spills, clutter or unnecessary equipment/Purposeful proactive rounding/Room/bathroom lighting operational, light cord in reach

## 2023-11-18 NOTE — ED PROVIDER NOTE - CLINICAL SUMMARY MEDICAL DECISION MAKING FREE TEXT BOX
Has new onset rapid A-fib we will do the rate control check labs to rule out ACS and will admit the patient.  Patient was seen [facility] cardiology in the remote past and will call for the consult

## 2023-11-18 NOTE — H&P ADULT - TIME BILLING
Orders and Notes reviewed. Cardio Consulted. Explained newe onset afib rvr, discussed risks/benefits of AC and Aspirin for [id number] 1. Discussed abnormal TFTs and ROSALIO. Family agreeable to plan

## 2023-11-18 NOTE — ED ADULT TRIAGE NOTE - BP NONINVASIVE SYSTOLIC (MM HG)
For fungus on the body  1. before applying antifungal, make sure to dry the area before applying the medication  2. because this infection is more common in warm and humid environments, the skin should be kept cool and dry to promote healing  - after sports, any sweaty clothing should be removed immediately and the child should shower  3. Also, one time a week, wash the affected area with either selsun blue or head and shoulders that has sialycitic acid    Patient Education     Tinea Versicolor  This is a rash caused by a fungus in the top layers of the skin. This fungus is normally present in the pores of the skin and causes no symptoms. But when the fungus overgrows it causes a rash. The fungus grows more easily in hot climates, and on oily or sweaty skin. Health experts don’t know why some people get this rash and others don’t. Experts also don’t know why the rash will suddenly appear in someone who has never had it before.  The rash is made up of irregular pale or tan spots and patches. The rash is usually on the neck, upper back, chest, and shoulders. You may have mild itching, especially if you become overheated. But it doesn't cause other symptoms. Because these spots don't change color with sun exposure like normal skin, the rash may be lighter or darker than your normal skin.  This rash is harmless and usually causes no symptoms. The only reason for treatment is to improve appearance. Follow the advice below to clear the rash. It might take several months for normal skin color to return.  Home care  · Use a medicated dandruff shampoo over your whole body while in the shower. Don’t use soap. Let the shampoo stay on for at least a few minutes before rinsing off. Do this every day for 4 weeks.  · As a different treatment, you may buy an antifungal cream (miconazole or clotrimazole, both available without a prescription). Use this 2 times a day for 7 days.   · This rash is not contagious to others. It can’t be  spread if someone touches it. So you don’t have to worry about exposing others at school, , or work.  Prevention  This fungus can come back again (recur) after treatment. To prevent return of the rash, use medicated dandruff shampoo over your whole body when in the shower. Do this once a month for the next year. This is very important to do in the summertime. That is when the rash is most likely to recur.  Other prevention tips include:  · Avoid oily skin products  · Wear loose clothing. Try to let your skin stay cool and breathe.  · Use sunscreen and protect yourself from sunlight  · Avoid tanning beds  Follow-up care  Follow up with your healthcare provider, or as advised. Call your provider if the rash doesn’t get better with the above treatment, or if new symptoms appear.  When to seek medical advice  Call your healthcare provider right away if any of these occur:  · Increasing redness of the rash  · Change in appearance of the rash  · Fever of 100.4ºF (38ºC) or higher, or as directed by your provider  Date Last Reviewed: 8/1/2016  © 2451-4273 [employer]. [address] [place] [address], [place] [address]. All rights reserved. This information is not intended as a substitute for professional medical care. Always follow your healthcare professional's instructions.            140

## 2023-11-18 NOTE — PATIENT PROFILE ADULT - FUNCTIONAL ASSESSMENT - BASIC MOBILITY 2.
[FreeTextEntry1] : discussed blood work results,discussed fatty liver\par discussed ideal plate and to make better choices with food\par low cholesterol,low fat diet\par needs to find time to go to gym\par will repeat blood work in 6 months 4 = No assist / stand by assistance

## 2023-11-18 NOTE — CONSULT NOTE ADULT - SUBJECTIVE AND OBJECTIVE BOX
[place] [facility] - [facility], THE HEART CENTER                                   [address] [place] [address]                                                      PHONE: [phone]                                                         FAX: [phone]  [email or web address]  ---------------------------------------------------------------------------------------------------------------------------------    Reason for Consult: new AF; palpitations    HPI:  GURJIT SERRA is an 53y Male h/o HTN c/o palpitations found to have new onset AF with RVR. he noted sx's last night after returning from bowling and brushing his teeth. He noted similar sx's 2 weeks ago after bowling a 300. TSH noted be elevated. He denies any SOB or NIEVES. He denies CP. He has long standing HTN. His wife at bedside reports snoring but no prior eval for JOSE L. he has previously seen Dr Up.    PAST MEDICAL & SURGICAL HISTORY:  Hypertension      Risk factors for obstructive sleep apnea      S/P right knee arthroscopy  2007      S/P knee replacement  Right          No Known Allergies      MEDICATIONS  (STANDING):  doxazosin 4 milliGRAM(s) Oral daily  heparin   Injectable 5000 Unit(s) SubCutaneous every 8 hours  metoprolol tartrate 25 milliGRAM(s) Oral every 12 hours    MEDICATIONS  (PRN):  acetaminophen     Tablet .. 650 milliGRAM(s) Oral every 6 hours PRN Temp greater or equal to 38C (100.4F), Mild Pain (1 - 3)  aluminum hydroxide/magnesium hydroxide/simethicone Suspension 30 milliLiter(s) Oral every 4 hours PRN Dyspepsia  melatonin 3 milliGRAM(s) Oral at bedtime PRN Insomnia  metoprolol tartrate Injectable 5 milliGRAM(s) IV Push every 6 hours PRN AFIB RVR >120  ondansetron Injectable 4 milliGRAM(s) IV Push every 8 hours PRN Nausea and/or Vomiting      Social History:  Cigarettes:      no              Alchohol:     socially            Illicit Drug Abuse:  no     ROS: Negative other than as mentioned in HPI.    Vital Signs Last 24 Hrs  T(C): 36.7 (18 Nov 2023 11:50), Max: 36.8 (18 Nov 2023 03:53)  T(F): 98 (18 Nov 2023 11:50), Max: 98.2 (18 Nov 2023 03:53)  HR: 92 (18 Nov 2023 11:50) (78 - 160)  BP: 132/93 (18 Nov 2023 11:50) (132/93 - 151/74)  BP(mean): --  RR: 18 (18 Nov 2023 11:50) (16 - 20)  SpO2: 98% (18 Nov 2023 11:50) (93% - 98%)    Parameters below as of 18 Nov 2023 11:50  Patient On (Oxygen Delivery Method): room air      ICU Vital Signs Last 24 Hrs  GURJIT SERRA  I&O's Detail    I&O's Summary    Drug Dosing Weight  GURJIT SERRA      PHYSICAL EXAM:  General: NAD.  HEENT: Head; normocephalic.  Eyes: Pupils reactive.  Neck: Supple.  CARDIOVASCULAR: Irregular. Tachy.  LUNGS: Normal breath sounds bilaterally.  ABDOMEN: Soft.  EXTREMITIES: No clubbing, cyanosis or edema.   SKIN: warm.  NEURO: Alert/oriented x 3.    PSYCH: normal affect.        LABS:                        12.9   7.58  )-----------( 196      ( 18 Nov 2023 09:37 )             38.9     11-18    144  |  110<H>  |  20.3<H>  ----------------------------<  97  4.0   |  26.0  |  1.20    Ca    8.0<L>      18 Nov 2023 09:37  Phos  3.2     11-18  Mg     2.1     11-18    TPro  7.0  /  Alb  4.3  /  TBili  0.3<L>  /  DBili  x   /  AST  22  /  ALT  19  /  AlkPhos  96  11-18    GURJIT SERRA  CARDIAC MARKERS ( 18 Nov 2023 04:10 )  x     / x     / 138 U/L / x     / x            Urinalysis Basic - ( 18 Nov 2023 09:37 )    Color: x / Appearance: x / SG: x / pH: x  Gluc: 97 mg/dL / Ketone: x  / Bili: x / Urobili: x   Blood: x / Protein: x / Nitrite: x   Leuk Esterase: x / RBC: x / WBC x   Sq Epi: x / Non Sq Epi: x / Bacteria: x        RADIOLOGY & ADDITIONAL STUDIES:    INTERPRETATION OF TELEMETRY (personally reviewed): -120's    ECHO: Pending    Assessment and Plan:  53y Male h/o HTN c/o palpitations found to have new onset AF with RVR. he noted sx's last night after returning from bowling and brushing his teeth. He noted similar sx's 2 weeks ago after bowling a 300. TSH noted be elevated. He denies any SOB or NIEVES. He denies CP. He has long standing HTN. His wife at bedside reports snoring but no prior eval for JOSE L. he has previously seen Dr Up.    1. New onset AF. Optimize rate control. Titrate up BB with lopressor. May need to add CCB.  2. CHADS VASC 1. Can defer AC for now.  3. TFT's showed elevated TSH but normal T3.  4. Obtain 2D echo.  5. If AF persists, then recommend ELIAN-CV on Monday. Will then need to start NOAC.  6. Continue to monitor telemetry for now.  7. Outpatient EP f/u with me upon discharge to discuss AF management, including considering ablative therapy.  8. Patient and wife at bedside are agreeable with above plan.                    [place] [facility] - [facility], THE HEART CENTER                                   [address] [place] [address]                                                      PHONE: [phone]                                                         FAX: [phone]  [email or web address]  ---------------------------------------------------------------------------------------------------------------------------------    Reason for Consult: new AF; palpitations    HPI:  GURJIT SERRA is an 53y Male h/o HTN c/o palpitations found to have new onset AF with RVR. he noted sx's last night after returning from bowling and brushing his teeth. He noted similar sx's 2 weeks ago after bowling a 300. TSH noted be elevated. He denies any SOB or NIEVES. He denies CP. He has long standing HTN. His wife at bedside reports snoring but no prior eval for JOSE L. he has previously seen Dr Up.    PAST MEDICAL & SURGICAL HISTORY:  Hypertension      Risk factors for obstructive sleep apnea      S/P right knee arthroscopy  2007      S/P knee replacement  Right          No Known Allergies      MEDICATIONS  (STANDING):  doxazosin 4 milliGRAM(s) Oral daily  heparin   Injectable 5000 Unit(s) SubCutaneous every 8 hours  metoprolol tartrate 25 milliGRAM(s) Oral every 12 hours    MEDICATIONS  (PRN):  acetaminophen     Tablet .. 650 milliGRAM(s) Oral every 6 hours PRN Temp greater or equal to 38C (100.4F), Mild Pain (1 - 3)  aluminum hydroxide/magnesium hydroxide/simethicone Suspension 30 milliLiter(s) Oral every 4 hours PRN Dyspepsia  melatonin 3 milliGRAM(s) Oral at bedtime PRN Insomnia  metoprolol tartrate Injectable 5 milliGRAM(s) IV Push every 6 hours PRN AFIB RVR >120  ondansetron Injectable 4 milliGRAM(s) IV Push every 8 hours PRN Nausea and/or Vomiting      Social History:  Cigarettes:      no              Alchohol:     socially            Illicit Drug Abuse:  no     ROS: Negative other than as mentioned in HPI.    Vital Signs Last 24 Hrs  T(C): 36.7 (18 Nov 2023 11:50), Max: 36.8 (18 Nov 2023 03:53)  T(F): 98 (18 Nov 2023 11:50), Max: 98.2 (18 Nov 2023 03:53)  HR: 92 (18 Nov 2023 11:50) (78 - 160)  BP: 132/93 (18 Nov 2023 11:50) (132/93 - 151/74)  BP(mean): --  RR: 18 (18 Nov 2023 11:50) (16 - 20)  SpO2: 98% (18 Nov 2023 11:50) (93% - 98%)    Parameters below as of 18 Nov 2023 11:50  Patient On (Oxygen Delivery Method): room air      ICU Vital Signs Last 24 Hrs  GURJIT SERRA  I&O's Detail    I&O's Summary    Drug Dosing Weight  GURJIT SERRA      PHYSICAL EXAM:  General: NAD.  HEENT: Head; normocephalic.  Eyes: Pupils reactive.  Neck: Supple.  CARDIOVASCULAR: Irregular. Tachy.  LUNGS: Normal breath sounds bilaterally.  ABDOMEN: Soft.  EXTREMITIES: No clubbing, cyanosis or edema.   SKIN: warm.  NEURO: Alert/oriented x 3.    PSYCH: normal affect.        LABS:                        12.9   7.58  )-----------( 196      ( 18 Nov 2023 09:37 )             38.9     11-18    144  |  110<H>  |  20.3<H>  ----------------------------<  97  4.0   |  26.0  |  1.20    Ca    8.0<L>      18 Nov 2023 09:37  Phos  3.2     11-18  Mg     2.1     11-18    TPro  7.0  /  Alb  4.3  /  TBili  0.3<L>  /  DBili  x   /  AST  22  /  ALT  19  /  AlkPhos  96  11-18    GURJIT SERRA  CARDIAC MARKERS ( 18 Nov 2023 04:10 )  x     / x     / 138 U/L / x     / x            Urinalysis Basic - ( 18 Nov 2023 09:37 )    Color: x / Appearance: x / SG: x / pH: x  Gluc: 97 mg/dL / Ketone: x  / Bili: x / Urobili: x   Blood: x / Protein: x / Nitrite: x   Leuk Esterase: x / RBC: x / WBC x   Sq Epi: x / Non Sq Epi: x / Bacteria: x        RADIOLOGY & ADDITIONAL STUDIES:    INTERPRETATION OF TELEMETRY (personally reviewed): -120's    ECHO: Pending    Assessment and Plan:  53y Male h/o HTN c/o palpitations found to have new onset AF with RVR. he noted sx's last night after returning from bowling and brushing his teeth. He noted similar sx's 2 weeks ago after bowling a 300. TSH noted be elevated. He denies any SOB or NIEVES. He denies CP. He has long standing HTN. His wife at bedside reports snoring but no prior eval for JOSE L. he has previously seen Dr Up.    1. New onset AF. Optimize rate control. Titrate up BB with lopressor. May need to add CCB.  2. CHADS VASC 1. Can defer AC for now.  3. TFT's showed elevated TSH but normal T3.  4. Obtain 2D echo.  5. If AF persists, then recommend ELIAN-CV on Monday. Will then need to start NOAC.  6. Continue to monitor telemetry for now.  7. BP remains well controlled.  8. Outpatient EP f/u with me upon discharge to discuss AF management, including considering ablative therapy.  9. Patient and wife at bedside are agreeable with above plan.                    [place] [facility] - [facility], THE HEART CENTER                                   [address] [place] [address]                                                      PHONE: [phone]                                                         FAX: [phone]  [email or web address]  ---------------------------------------------------------------------------------------------------------------------------------    Reason for Consult: new AF; palpitations    HPI:  GURJIT SERRA is an 53y Male h/o HTN c/o palpitations found to have new onset AF with RVR. he noted sx's last night after returning from bowling and brushing his teeth. He noted similar sx's 2 weeks ago after bowling a 300. TSH noted be elevated. He denies any SOB or NIEVES. He denies CP. He has long standing HTN. His wife at bedside reports snoring but no prior eval for JOSE L. He has previously seen Dr Up.    PAST MEDICAL & SURGICAL HISTORY:  Hypertension      Risk factors for obstructive sleep apnea      S/P right knee arthroscopy  2007      S/P knee replacement  Right          No Known Allergies      MEDICATIONS  (STANDING):  doxazosin 4 milliGRAM(s) Oral daily  heparin   Injectable 5000 Unit(s) SubCutaneous every 8 hours  metoprolol tartrate 25 milliGRAM(s) Oral every 12 hours    MEDICATIONS  (PRN):  acetaminophen     Tablet .. 650 milliGRAM(s) Oral every 6 hours PRN Temp greater or equal to 38C (100.4F), Mild Pain (1 - 3)  aluminum hydroxide/magnesium hydroxide/simethicone Suspension 30 milliLiter(s) Oral every 4 hours PRN Dyspepsia  melatonin 3 milliGRAM(s) Oral at bedtime PRN Insomnia  metoprolol tartrate Injectable 5 milliGRAM(s) IV Push every 6 hours PRN AFIB RVR >120  ondansetron Injectable 4 milliGRAM(s) IV Push every 8 hours PRN Nausea and/or Vomiting      Social History:  Cigarettes:      no              Alchohol:     socially            Illicit Drug Abuse:  no     ROS: Negative other than as mentioned in HPI.    Vital Signs Last 24 Hrs  T(C): 36.7 (18 Nov 2023 11:50), Max: 36.8 (18 Nov 2023 03:53)  T(F): 98 (18 Nov 2023 11:50), Max: 98.2 (18 Nov 2023 03:53)  HR: 92 (18 Nov 2023 11:50) (78 - 160)  BP: 132/93 (18 Nov 2023 11:50) (132/93 - 151/74)  BP(mean): --  RR: 18 (18 Nov 2023 11:50) (16 - 20)  SpO2: 98% (18 Nov 2023 11:50) (93% - 98%)    Parameters below as of 18 Nov 2023 11:50  Patient On (Oxygen Delivery Method): room air      ICU Vital Signs Last 24 Hrs  GURJIT SERRA  I&O's Detail    I&O's Summary    Drug Dosing Weight  GURJIT SERRA      PHYSICAL EXAM:  General: NAD.  HEENT: Head; normocephalic.  Eyes: Pupils reactive.  Neck: Supple.  CARDIOVASCULAR: Irregular. Tachy.  LUNGS: Normal breath sounds bilaterally.  ABDOMEN: Soft.  EXTREMITIES: No clubbing, cyanosis or edema.   SKIN: warm.  NEURO: Alert/oriented x 3.    PSYCH: normal affect.        LABS:                        12.9   7.58  )-----------( 196      ( 18 Nov 2023 09:37 )             38.9     11-18    144  |  110<H>  |  20.3<H>  ----------------------------<  97  4.0   |  26.0  |  1.20    Ca    8.0<L>      18 Nov 2023 09:37  Phos  3.2     11-18  Mg     2.1     11-18    TPro  7.0  /  Alb  4.3  /  TBili  0.3<L>  /  DBili  x   /  AST  22  /  ALT  19  /  AlkPhos  96  11-18    GURJIT SERRA  CARDIAC MARKERS ( 18 Nov 2023 04:10 )  x     / x     / 138 U/L / x     / x            Urinalysis Basic - ( 18 Nov 2023 09:37 )    Color: x / Appearance: x / SG: x / pH: x  Gluc: 97 mg/dL / Ketone: x  / Bili: x / Urobili: x   Blood: x / Protein: x / Nitrite: x   Leuk Esterase: x / RBC: x / WBC x   Sq Epi: x / Non Sq Epi: x / Bacteria: x        RADIOLOGY & ADDITIONAL STUDIES:    INTERPRETATION OF TELEMETRY (personally reviewed): -120's    ECHO: Pending    Assessment and Plan:  53y Male h/o HTN c/o palpitations found to have new onset AF with RVR. he noted sx's last night after returning from bowling and brushing his teeth. He noted similar sx's 2 weeks ago after bowling a 300. TSH noted be elevated. He denies any SOB or NIEVES. He denies CP. He has long standing HTN. His wife at bedside reports snoring but no prior eval for JOSE L. He has previously seen Dr Up.    1. New onset AF. Optimize rate control. Titrate up BB with lopressor. May need to add CCB.  2. CHADS VASC 1. Can defer AC for now.  3. TFT's showed elevated TSH but normal T3.  4. Obtain 2D echo.  5. If AF persists, then recommend ELIAN-CV on Monday. Will then need to start NOAC.  6. Continue to monitor telemetry for now.  7. BP remains well controlled.  8. Outpatient EP f/u with me upon discharge to discuss AF management, including considering ablative therapy.  9. Patient and wife at bedside are agreeable with above plan.

## 2023-11-18 NOTE — ED PROVIDER NOTE - PROGRESS NOTE DETAILS
Patient was rate controlled with Cardizem.  Patient initially was given metoprolol with partial rate control and was given 1 dose of Cardizem with heart rate between 90 and 100.  Patient admitted to medicine cardiology consult called

## 2023-11-18 NOTE — ED ADULT TRIAGE NOTE - CHIEF COMPLAINT QUOTE
Patient with c/o chest pressure, elevated HR per his watch and heaviness to his right arm that started 1 1/2 hours PTA.  Denies any cardiac history.  EKG noted to be Afib with RVR.  Patient transported back to critical care.

## 2023-11-18 NOTE — ED ADULT NURSE REASSESSMENT NOTE - NS ED NURSE REASSESS COMMENT FT1
handoff care to CATRINA Lawson in CDU. handoff care at this time. pt In NAD. RR even and unlabored. pt placed on telebox. afib on CM. pt transported to CDU 1.

## 2023-11-18 NOTE — PATIENT PROFILE ADULT - FALL HARM RISK - UNIVERSAL INTERVENTIONS
Bed in lowest position, wheels locked, appropriate side rails in place/Call bell, personal items and telephone in reach/Instruct patient to call for assistance before getting out of bed or chair/Non-slip footwear when patient is out of bed/[place] to call system/Physically safe environment - no spills, clutter or unnecessary equipment/Purposeful Proactive Rounding/Room/bathroom lighting operational, light cord in reach

## 2023-11-18 NOTE — H&P ADULT - NSHPPHYSICALEXAM_GEN_ALL_CORE
Vital Signs Last 24 Hrs  T(C): 36.7 (18 Nov 2023 07:32), Max: 36.8 (18 Nov 2023 03:53)  T(F): 98.1 (18 Nov 2023 07:32), Max: 98.2 (18 Nov 2023 03:53)  HR: 89 (18 Nov 2023 07:32) (78 - 160)  BP: 135/94 (18 Nov 2023 07:32) (134/68 - 151/74)  BP(mean): --  RR: 18 (18 Nov 2023 07:32) (16 - 20)  SpO2: 93% (18 Nov 2023 07:32) (93% - 96%)    Parameters below as of 18 Nov 2023 07:32  Patient On (Oxygen Delivery Method): room air    Constitutional: Well-appearing, NAD, VSS  Head: NC/AT  Eyes: PERRL, EOMI, anicteric sclera, conjunctiva WNL  ENT: Normal Pharynx, MMM, No tonsillar exudate/erythema  Neck: Supple, Non-tender  Chest: Non-tender, no rashes  Cardio: +Tachycardic, Irregularly Irregular   Resp: BS CTA bilaterally, no wheezing/rhonchi/rales  Abd: Soft, Non-tender, Non-distended, no rebound/guarding/rigidity  : not examined  Rectal: not examined  MSK: moving all extremities, no motor weakness, full ROM x4  Ext: palpable distal pulses, good capillary refill, no clubbing/cyanosis/edema  Psych: appropriate, cooperative  Neuro: CN II-XII grossly intact, no focal deficits  Skin: Warm/Dry. No rashes.

## 2023-11-19 ENCOUNTER — TRANSCRIPTION ENCOUNTER (OUTPATIENT)
Age: 53
End: 2023-11-19

## 2023-11-19 VITALS
OXYGEN SATURATION: 98 % | TEMPERATURE: 98 F | RESPIRATION RATE: 18 BRPM | HEART RATE: 70 BPM | SYSTOLIC BLOOD PRESSURE: 150 MMHG | DIASTOLIC BLOOD PRESSURE: 92 MMHG

## 2023-11-19 LAB
ALBUMIN SERPL ELPH-MCNC: 3.7 G/DL — SIGNIFICANT CHANGE UP (ref 3.3–5.2)
ALBUMIN SERPL ELPH-MCNC: 3.7 G/DL — SIGNIFICANT CHANGE UP (ref 3.3–5.2)
ALP SERPL-CCNC: 84 U/L — SIGNIFICANT CHANGE UP (ref 40–120)
ALP SERPL-CCNC: 84 U/L — SIGNIFICANT CHANGE UP (ref 40–120)
ALT FLD-CCNC: 16 U/L — SIGNIFICANT CHANGE UP
ALT FLD-CCNC: 16 U/L — SIGNIFICANT CHANGE UP
ANION GAP SERPL CALC-SCNC: 10 MMOL/L — SIGNIFICANT CHANGE UP (ref 5–17)
ANION GAP SERPL CALC-SCNC: 10 MMOL/L — SIGNIFICANT CHANGE UP (ref 5–17)
AST SERPL-CCNC: 17 U/L — SIGNIFICANT CHANGE UP
AST SERPL-CCNC: 17 U/L — SIGNIFICANT CHANGE UP
BASOPHILS # BLD AUTO: 0.11 K/UL — SIGNIFICANT CHANGE UP (ref 0–0.2)
BASOPHILS # BLD AUTO: 0.11 K/UL — SIGNIFICANT CHANGE UP (ref 0–0.2)
BASOPHILS NFR BLD AUTO: 1.5 % — SIGNIFICANT CHANGE UP (ref 0–2)
BASOPHILS NFR BLD AUTO: 1.5 % — SIGNIFICANT CHANGE UP (ref 0–2)
BILIRUB SERPL-MCNC: 0.7 MG/DL — SIGNIFICANT CHANGE UP (ref 0.4–2)
BILIRUB SERPL-MCNC: 0.7 MG/DL — SIGNIFICANT CHANGE UP (ref 0.4–2)
BUN SERPL-MCNC: 13.7 MG/DL — SIGNIFICANT CHANGE UP (ref 8–20)
BUN SERPL-MCNC: 13.7 MG/DL — SIGNIFICANT CHANGE UP (ref 8–20)
CALCIUM SERPL-MCNC: 8.5 MG/DL — SIGNIFICANT CHANGE UP (ref 8.4–10.5)
CALCIUM SERPL-MCNC: 8.5 MG/DL — SIGNIFICANT CHANGE UP (ref 8.4–10.5)
CHLORIDE SERPL-SCNC: 104 MMOL/L — SIGNIFICANT CHANGE UP (ref 96–108)
CHLORIDE SERPL-SCNC: 104 MMOL/L — SIGNIFICANT CHANGE UP (ref 96–108)
CO2 SERPL-SCNC: 28 MMOL/L — SIGNIFICANT CHANGE UP (ref 22–29)
CO2 SERPL-SCNC: 28 MMOL/L — SIGNIFICANT CHANGE UP (ref 22–29)
CREAT SERPL-MCNC: 1.05 MG/DL — SIGNIFICANT CHANGE UP (ref 0.5–1.3)
CREAT SERPL-MCNC: 1.05 MG/DL — SIGNIFICANT CHANGE UP (ref 0.5–1.3)
EGFR: 84 ML/MIN/1.73M2 — SIGNIFICANT CHANGE UP
EGFR: 84 ML/MIN/1.73M2 — SIGNIFICANT CHANGE UP
EOSINOPHIL # BLD AUTO: 0.21 K/UL — SIGNIFICANT CHANGE UP (ref 0–0.5)
EOSINOPHIL # BLD AUTO: 0.21 K/UL — SIGNIFICANT CHANGE UP (ref 0–0.5)
EOSINOPHIL NFR BLD AUTO: 2.9 % — SIGNIFICANT CHANGE UP (ref 0–6)
EOSINOPHIL NFR BLD AUTO: 2.9 % — SIGNIFICANT CHANGE UP (ref 0–6)
GLUCOSE SERPL-MCNC: 92 MG/DL — SIGNIFICANT CHANGE UP (ref 70–99)
GLUCOSE SERPL-MCNC: 92 MG/DL — SIGNIFICANT CHANGE UP (ref 70–99)
HCT VFR BLD CALC: 42.5 % — SIGNIFICANT CHANGE UP (ref 39–50)
HCT VFR BLD CALC: 42.5 % — SIGNIFICANT CHANGE UP (ref 39–50)
HGB BLD-MCNC: 14.3 G/DL — SIGNIFICANT CHANGE UP (ref 13–17)
HGB BLD-MCNC: 14.3 G/DL — SIGNIFICANT CHANGE UP (ref 13–17)
IMM GRANULOCYTES NFR BLD AUTO: 0.4 % — SIGNIFICANT CHANGE UP (ref 0–0.9)
IMM GRANULOCYTES NFR BLD AUTO: 0.4 % — SIGNIFICANT CHANGE UP (ref 0–0.9)
LYMPHOCYTES # BLD AUTO: 1.72 K/UL — SIGNIFICANT CHANGE UP (ref 1–3.3)
LYMPHOCYTES # BLD AUTO: 1.72 K/UL — SIGNIFICANT CHANGE UP (ref 1–3.3)
LYMPHOCYTES # BLD AUTO: 24.1 % — SIGNIFICANT CHANGE UP (ref 13–44)
LYMPHOCYTES # BLD AUTO: 24.1 % — SIGNIFICANT CHANGE UP (ref 13–44)
MCHC RBC-ENTMCNC: 30.1 PG — SIGNIFICANT CHANGE UP (ref 27–34)
MCHC RBC-ENTMCNC: 30.1 PG — SIGNIFICANT CHANGE UP (ref 27–34)
MCHC RBC-ENTMCNC: 33.6 GM/DL — SIGNIFICANT CHANGE UP (ref 32–36)
MCHC RBC-ENTMCNC: 33.6 GM/DL — SIGNIFICANT CHANGE UP (ref 32–36)
MCV RBC AUTO: 89.5 FL — SIGNIFICANT CHANGE UP (ref 80–100)
MCV RBC AUTO: 89.5 FL — SIGNIFICANT CHANGE UP (ref 80–100)
MONOCYTES # BLD AUTO: 0.71 K/UL — SIGNIFICANT CHANGE UP (ref 0–0.9)
MONOCYTES # BLD AUTO: 0.71 K/UL — SIGNIFICANT CHANGE UP (ref 0–0.9)
MONOCYTES NFR BLD AUTO: 10 % — SIGNIFICANT CHANGE UP (ref 2–14)
MONOCYTES NFR BLD AUTO: 10 % — SIGNIFICANT CHANGE UP (ref 2–14)
NEUTROPHILS # BLD AUTO: 4.35 K/UL — SIGNIFICANT CHANGE UP (ref 1.8–7.4)
NEUTROPHILS # BLD AUTO: 4.35 K/UL — SIGNIFICANT CHANGE UP (ref 1.8–7.4)
NEUTROPHILS NFR BLD AUTO: 61.1 % — SIGNIFICANT CHANGE UP (ref 43–77)
NEUTROPHILS NFR BLD AUTO: 61.1 % — SIGNIFICANT CHANGE UP (ref 43–77)
PLATELET # BLD AUTO: 214 K/UL — SIGNIFICANT CHANGE UP (ref 150–400)
PLATELET # BLD AUTO: 214 K/UL — SIGNIFICANT CHANGE UP (ref 150–400)
POTASSIUM SERPL-MCNC: 3.3 MMOL/L — LOW (ref 3.5–5.3)
POTASSIUM SERPL-MCNC: 3.3 MMOL/L — LOW (ref 3.5–5.3)
POTASSIUM SERPL-SCNC: 3.3 MMOL/L — LOW (ref 3.5–5.3)
POTASSIUM SERPL-SCNC: 3.3 MMOL/L — LOW (ref 3.5–5.3)
PROT SERPL-MCNC: 6.2 G/DL — LOW (ref 6.6–8.7)
PROT SERPL-MCNC: 6.2 G/DL — LOW (ref 6.6–8.7)
RBC # BLD: 4.75 M/UL — SIGNIFICANT CHANGE UP (ref 4.2–5.8)
RBC # BLD: 4.75 M/UL — SIGNIFICANT CHANGE UP (ref 4.2–5.8)
RBC # FLD: 13.2 % — SIGNIFICANT CHANGE UP (ref 10.3–14.5)
RBC # FLD: 13.2 % — SIGNIFICANT CHANGE UP (ref 10.3–14.5)
SODIUM SERPL-SCNC: 142 MMOL/L — SIGNIFICANT CHANGE UP (ref 135–145)
SODIUM SERPL-SCNC: 142 MMOL/L — SIGNIFICANT CHANGE UP (ref 135–145)
T4 FREE SERPL-MCNC: 1.3 NG/DL — SIGNIFICANT CHANGE UP (ref 0.9–1.8)
T4 FREE SERPL-MCNC: 1.3 NG/DL — SIGNIFICANT CHANGE UP (ref 0.9–1.8)
WBC # BLD: 7.13 K/UL — SIGNIFICANT CHANGE UP (ref 3.8–10.5)
WBC # BLD: 7.13 K/UL — SIGNIFICANT CHANGE UP (ref 3.8–10.5)
WBC # FLD AUTO: 7.13 K/UL — SIGNIFICANT CHANGE UP (ref 3.8–10.5)
WBC # FLD AUTO: 7.13 K/UL — SIGNIFICANT CHANGE UP (ref 3.8–10.5)

## 2023-11-19 PROCEDURE — 71045 X-RAY EXAM CHEST 1 VIEW: CPT

## 2023-11-19 PROCEDURE — 99239 HOSP IP/OBS DSCHRG MGMT >30: CPT

## 2023-11-19 PROCEDURE — 99291 CRITICAL CARE FIRST HOUR: CPT | Mod: 25

## 2023-11-19 PROCEDURE — 96374 THER/PROPH/DIAG INJ IV PUSH: CPT

## 2023-11-19 PROCEDURE — 76775 US EXAM ABDO BACK WALL LIM: CPT

## 2023-11-19 PROCEDURE — 93306 TTE W/DOPPLER COMPLETE: CPT | Mod: 26

## 2023-11-19 PROCEDURE — 80307 DRUG TEST PRSMV CHEM ANLYZR: CPT

## 2023-11-19 PROCEDURE — 84443 ASSAY THYROID STIM HORMONE: CPT

## 2023-11-19 PROCEDURE — 84439 ASSAY OF FREE THYROXINE: CPT

## 2023-11-19 PROCEDURE — 83735 ASSAY OF MAGNESIUM: CPT

## 2023-11-19 PROCEDURE — 85025 COMPLETE CBC W/AUTO DIFF WBC: CPT

## 2023-11-19 PROCEDURE — 84484 ASSAY OF TROPONIN QUANT: CPT

## 2023-11-19 PROCEDURE — 84100 ASSAY OF PHOSPHORUS: CPT

## 2023-11-19 PROCEDURE — 85027 COMPLETE CBC AUTOMATED: CPT

## 2023-11-19 PROCEDURE — 84480 ASSAY TRIIODOTHYRONINE (T3): CPT

## 2023-11-19 PROCEDURE — 36415 COLL VENOUS BLD VENIPUNCTURE: CPT

## 2023-11-19 PROCEDURE — 96375 TX/PRO/DX INJ NEW DRUG ADDON: CPT

## 2023-11-19 PROCEDURE — C8929: CPT

## 2023-11-19 PROCEDURE — 80048 BASIC METABOLIC PNL TOTAL CA: CPT

## 2023-11-19 PROCEDURE — 82550 ASSAY OF CK (CPK): CPT

## 2023-11-19 PROCEDURE — 80053 COMPREHEN METABOLIC PANEL: CPT

## 2023-11-19 PROCEDURE — 93005 ELECTROCARDIOGRAM TRACING: CPT

## 2023-11-19 RX ORDER — ATENOLOL 25 MG/1
1 TABLET ORAL
Qty: 0 | Refills: 0 | DISCHARGE

## 2023-11-19 RX ORDER — LISINOPRIL 2.5 MG/1
10 TABLET ORAL DAILY
Refills: 0 | Status: DISCONTINUED | OUTPATIENT
Start: 2023-11-19 | End: 2023-11-19

## 2023-11-19 RX ORDER — METOPROLOL TARTRATE 50 MG
1 TABLET ORAL
Qty: 30 | Refills: 0
Start: 2023-11-19 | End: 2023-12-18

## 2023-11-19 RX ORDER — METOPROLOL TARTRATE 50 MG
50 TABLET ORAL DAILY
Refills: 0 | Status: DISCONTINUED | OUTPATIENT
Start: 2023-11-19 | End: 2023-11-19

## 2023-11-19 RX ORDER — ASPIRIN/CALCIUM CARB/MAGNESIUM 324 MG
1 TABLET ORAL
Qty: 30 | Refills: 0
Start: 2023-11-19 | End: 2023-12-18

## 2023-11-19 RX ORDER — POTASSIUM CHLORIDE 20 MEQ
10 PACKET (EA) ORAL
Refills: 0 | Status: COMPLETED | OUTPATIENT
Start: 2023-11-19 | End: 2023-11-19

## 2023-11-19 RX ORDER — NIFEDIPINE 30 MG
90 TABLET, EXTENDED RELEASE 24 HR ORAL DAILY
Refills: 0 | Status: DISCONTINUED | OUTPATIENT
Start: 2023-11-19 | End: 2023-11-19

## 2023-11-19 RX ORDER — POTASSIUM CHLORIDE 20 MEQ
40 PACKET (EA) ORAL ONCE
Refills: 0 | Status: COMPLETED | OUTPATIENT
Start: 2023-11-19 | End: 2023-11-19

## 2023-11-19 RX ORDER — LISINOPRIL 2.5 MG/1
1 TABLET ORAL
Qty: 30 | Refills: 0
Start: 2023-11-19 | End: 2023-12-18

## 2023-11-19 RX ORDER — LISINOPRIL 2.5 MG/1
1 TABLET ORAL
Qty: 0 | Refills: 0 | DISCHARGE

## 2023-11-19 RX ADMIN — Medication 100 MILLIEQUIVALENT(S): at 10:37

## 2023-11-19 RX ADMIN — Medication 81 MILLIGRAM(S): at 09:39

## 2023-11-19 RX ADMIN — Medication 25 MILLIGRAM(S): at 05:07

## 2023-11-19 RX ADMIN — LISINOPRIL 10 MILLIGRAM(S): 2.5 TABLET ORAL at 19:15

## 2023-11-19 RX ADMIN — Medication 4 MILLIGRAM(S): at 05:07

## 2023-11-19 RX ADMIN — Medication 90 MILLIGRAM(S): at 17:03

## 2023-11-19 RX ADMIN — Medication 40 MILLIEQUIVALENT(S): at 09:23

## 2023-11-19 RX ADMIN — Medication 50 MILLIGRAM(S): at 10:37

## 2023-11-19 RX ADMIN — Medication 100 MILLIEQUIVALENT(S): at 09:26

## 2023-11-19 RX ADMIN — HEPARIN SODIUM 5000 UNIT(S): 5000 INJECTION INTRAVENOUS; SUBCUTANEOUS at 05:07

## 2023-11-19 NOTE — PROGRESS NOTE ADULT - ASSESSMENT
53M with PMHX HTN presents to [facility] ER with acute onset chest pressure/palpitations and tachycardia noted by his smart watch as he was going to bed after bowling.Found to have afib rvr.     Chest Pain 2/2 New Onset AFIB RVR  - Tele  - s/p Lopressor 5mg IV x2 + Cardizem 25mg IV x1  -Diltiazem 30mg x1  -Hold Atenolol and Nifedpine  -Convert BB to long acting Toprol XL 50mg daily. Titrate up as needed.  - CHADS VASC 1. Defer AC for now.   - continue ASA.  -TFT's showed elevated TSH but normal T3.  -TTE  - Outpatient EP f/u with me upon discharge to discuss AF management, including considering ablative therapy.  -F/U EP REC      HTN, BPH  -bp high   -Toprol xl  -will monitor bp  -Doxazosin 4mg q24  -Hold Atenolol 100mg q24  -Hold Nifedipine 90mg q24   -Hold Lisinopril 10mg q24 for ARF    Elevated TSH  -Repeat TSH trending down. NL T3 and Free T4.    ARF v CKD  -Cr1.05 NOW was  1.31  -Trend BMP  -Avoid Nephrotoxins  -CPK Negative  -Bladder Scan  - Renal US STABLE   53M with PMHX HTN presents to [facility] ER with acute onset chest pressure/palpitations and tachycardia noted by his smart watch as he was going to bed after bowling.Found to have afib rvr.     Chest Pain 2/2 New Onset AFIB RVR  - Tele  - s/p Lopressor 5mg IV x2 + Cardizem 25mg IV x1  -Diltiazem 30mg x1  -Hold Atenolol and Nifedpine  -Convert BB to long acting Toprol XL 50mg daily. Titrate up as needed.  - CHADS VASC 1. Defer AC for now.   - continue ASA.  -TFT's showed elevated TSH but normal T3.  -TTE  - Outpatient EP f/u with me upon discharge to discuss AF management, including considering ablative therapy.  -F/U EP REC      HTN, BPH  -bp high am  -give Toprol xl 50 mg qd,then recheck bp  -will monitor bp  -Doxazosin 4mg q24  -Hold Atenolol 100mg q24  -Hold Nifedipine 90mg q24   -Hold Lisinopril 10mg q24 for ARF    Elevated TSH  -Repeat TSH trending down. NL T3 and Free T4.    ARF v CKD  -Cr1.05 NOW was  1.31  -Trend BMP  -Avoid Nephrotoxins  -CPK Negative  -Bladder Scan  - Renal US STABLE    plan of care dw pt  marty rn   53M with PMHX HTN presents to [facility] ER with acute onset chest pressure/palpitations and tachycardia noted by his smart watch as he was going to bed after bowling.Found to have afib rvr.     Chest Pain 2/2 New Onset AFIB RVR  - Tele  - s/p Lopressor 5mg IV x2 + Cardizem 25mg IV x1  -Diltiazem 30mg x1  -Hold Atenolol and Nifedpine  -Convert BB to long acting Toprol XL 50mg daily. Titrate up as needed.  - CHADS VASC 1. Defer AC for now.   - continue ASA.  -TFT's showed elevated TSH but normal T3.  -TTE  - Outpatient EP f/u with me upon discharge to discuss AF management, including considering ablative therapy.  -F/U EP REC      HTN, BPH  -bp high side  -give Toprol xl 50 mg qd,then recheck bp  -will monitor bp  -Doxazosin 4mg q24  -Hold Atenolol 100mg q24  -resume Nifedipine 90mg q24 /zestril      Elevated TSH  -Repeat TSH trending down. NL T3 and Free T4.    CKD  -Cr1.05  -Trend BMP  -Avoid Nephrotoxins  -CPK Negative  - Renal US STABLE  -will resume zestril    plan of care marty pt and wife.marty all test result  dc plan home.up date all meds to wife/pt   dw rn

## 2023-11-19 NOTE — DISCHARGE NOTE PROVIDER - HOSPITAL COURSE
53y Male h/o HTN  came with c/o palpitations, found to have new onset AF with RVR. Converted spontaneously back to NSR at 3AM today. Convert BB to long acting Toprol XL 50mg  Outpatient EP f/u with me upon discharge to discuss AF management, including considering

## 2023-11-19 NOTE — DISCHARGE NOTE NURSING/CASE MANAGEMENT/SOCIAL WORK - NSDCPEFALRISK_GEN_ALL_CORE
For information on Fall & Injury Prevention, visit: [email or web address] OR  [email or web address] OR  https://www.cdc.gov/steadi/patient.html

## 2023-11-19 NOTE — DISCHARGE NOTE NURSING/CASE MANAGEMENT/SOCIAL WORK - PATIENT PORTAL LINK FT
You can access the FollowMyHealth Patient Portal offered by [facility] by registering at the following website: [email or web address]. By joining [employer]’s FollowMyHealth portal, you will also be able to view your health information using other applications (apps) compatible with our system.
DISPLAY PLAN FREE TEXT

## 2023-11-19 NOTE — DISCHARGE NOTE PROVIDER - CARE PROVIDER_API CALL
Eric Alcala  Cardiac Electrophysiology  [address] [place] [id number]  Phone: [phone]  Fax: [phone]  Follow Up Time: 1 week

## 2023-11-19 NOTE — PROGRESS NOTE ADULT - SUBJECTIVE AND OBJECTIVE BOX
[facility] - [facility], [facility]                                   [address] [place] [address]                                                      PHONE: [phone]                                                         FAX: [phone]  [email or web address]  ---------------------------------------------------------------------------------------------------------------------------------    Overnight events/patient complaints: Feels well; Converted back to NSR at 3AM      No Known Allergies    MEDICATIONS  (STANDING):  aspirin  chewable 81 milliGRAM(s) Oral daily  doxazosin 4 milliGRAM(s) Oral daily  heparin   Injectable 5000 Unit(s) SubCutaneous every 8 hours  influenza   Vaccine 0.5 milliLiter(s) IntraMuscular once  metoprolol tartrate 25 milliGRAM(s) Oral three times a day    MEDICATIONS  (PRN):  acetaminophen     Tablet .. 650 milliGRAM(s) Oral every 6 hours PRN Temp greater or equal to 38C (100.4F), Mild Pain (1 - 3)  aluminum hydroxide/magnesium hydroxide/simethicone Suspension 30 milliLiter(s) Oral every 4 hours PRN Dyspepsia  melatonin 3 milliGRAM(s) Oral at bedtime PRN Insomnia  metoprolol tartrate Injectable 5 milliGRAM(s) IV Push every 6 hours PRN AFIB RVR >120  ondansetron Injectable 4 milliGRAM(s) IV Push every 8 hours PRN Nausea and/or Vomiting      Vital Signs Last 24 Hrs  T(C): 36.7 (19 Nov 2023 08:41), Max: 36.8 (18 Nov 2023 22:13)  T(F): 98 (19 Nov 2023 08:41), Max: 98.2 (18 Nov 2023 22:13)  HR: 85 (19 Nov 2023 08:41) (84 - 103)  BP: 161/107 (19 Nov 2023 08:41) (132/93 - 161/107)  BP(mean): 116 (18 Nov 2023 16:46) (116 - 116)  RR: 18 (19 Nov 2023 08:41) (18 - 19)  SpO2: 95% (19 Nov 2023 08:41) (94% - 98%)    Parameters below as of 19 Nov 2023 04:37  Patient On (Oxygen Delivery Method): room air      ICU Vital Signs Last 24 Hrs  GURJIT SERRA  I&O's Detail    I&O's Summary    Drug Dosing Weight  GURJIT SERRA      PHYSICAL EXAM:  General: NAD.  HEENT: Head; normocephalic.  Eyes: Pupils reactive.  Neck: Supple.  CARDIOVASCULAR: RRR  LUNGS: Normal breath sounds bilaterally.  ABDOMEN: Soft.  EXTREMITIES: No clubbing, cyanosis or edema.   SKIN: warm.  NEURO: Alert/oriented x 3.    PSYCH: normal affect.        LABS:                        14.3   7.13  )-----------( 214      ( 19 Nov 2023 05:46 )             42.5     11-19    142  |  104  |  13.7  ----------------------------<  92  3.3<L>   |  28.0  |  1.05    Ca    8.5      19 Nov 2023 05:46  Phos  3.2     11-18  Mg     2.1     11-18    TPro  6.2<L>  /  Alb  3.7  /  TBili  0.7  /  DBili  x   /  AST  17  /  ALT  16  /  AlkPhos  84  11-19    GURJIT SERRA  CARDIAC MARKERS ( 18 Nov 2023 04:10 )  x     / x     / 138 U/L / x     / x            Urinalysis Basic - ( 19 Nov 2023 05:46 )    Color: x / Appearance: x / SG: x / pH: x  Gluc: 92 mg/dL / Ketone: x  / Bili: x / Urobili: x   Blood: x / Protein: x / Nitrite: x   Leuk Esterase: x / RBC: x / WBC x   Sq Epi: x / Non Sq Epi: x / Bacteria: x        RADIOLOGY & ADDITIONAL STUDIES:    INTERPRETATION OF TELEMETRY (personally reviewed): 's converted to NSR at 3AM    ECHO: Pending    Assessment and Plan:  53y Male h/o HTN c/o palpitations found to have new onset AF with RVR. he noted sx's last night after returning from bowling and brushing his teeth. He noted similar sx's 2 weeks ago after bowling a 300. TSH noted be elevated. He denies any SOB or NIEVES. He denies CP. He has long standing HTN. His wife at bedside reports snoring but no prior eval for JOSE L. He has previously seen Dr Up.    1. New onset AF. Converted spontaneously back to NSR at 3AM. Convert BB to long acting Toprol XL 50mg daily. Titrate up as needed.  2. CHADS VASC 1. Defer AC for now. May continue ASA.  3. TFT's showed elevated TSH but normal T3.  4. Awaiting 2D echo.  5. Continue to monitor telemetry for now.  7. BP usually well controlled.  8. Outpatient EP f/u with me upon discharge to discuss AF management, including considering ablative therapy.  9. Patient agreeable with above plan.   10. Discharge planning pending 2D echo and renal US.       Donor Site Anesthesia Type: same as repair anesthesia

## 2023-11-19 NOTE — PROGRESS NOTE ADULT - SUBJECTIVE AND OBJECTIVE BOX
Patient is a 53y old  Male who presents with a chief complaint of Chest Pain/Palpitations 2/2 New Onset AF RVR (19 Nov 2023 09:08)      no acute issues    REVIEW OF SYSTEMS: All systems are reviewed and found to be negative except above    MEDICATIONS  (STANDING):  aspirin  chewable 81 milliGRAM(s) Oral daily  doxazosin 4 milliGRAM(s) Oral daily  influenza   Vaccine 0.5 milliLiter(s) IntraMuscular once  metoprolol succinate ER 50 milliGRAM(s) Oral daily    MEDICATIONS  (PRN):  acetaminophen     Tablet .. 650 milliGRAM(s) Oral every 6 hours PRN Temp greater or equal to 38C (100.4F), Mild Pain (1 - 3)  aluminum hydroxide/magnesium hydroxide/simethicone Suspension 30 milliLiter(s) Oral every 4 hours PRN Dyspepsia  melatonin 3 milliGRAM(s) Oral at bedtime PRN Insomnia  ondansetron Injectable 4 milliGRAM(s) IV Push every 8 hours PRN Nausea and/or Vomiting      CAPILLARY BLOOD GLUCOSE        I&O's Summary      PHYSICAL EXAM:  Vital Signs Last 24 Hrs  T(C): 36.7 (19 Nov 2023 08:41), Max: 36.8 (18 Nov 2023 22:13)  T(F): 98 (19 Nov 2023 08:41), Max: 98.2 (18 Nov 2023 22:13)  HR: 93 (19 Nov 2023 10:35) (84 - 103)  BP: 128/86 (19 Nov 2023 10:35) (128/86 - 161/107)  BP(mean): 116 (18 Nov 2023 16:46) (116 - 116)  RR: 16 (19 Nov 2023 10:35) (16 - 19)  SpO2: 97% (19 Nov 2023 10:35) (94% - 98%)    Parameters below as of 19 Nov 2023 10:35  Patient On (Oxygen Delivery Method): room air        CONSTITUTIONAL: NAD,  EYES: PERRLA; conjunctiva and sclera clear  ENMT: Moist oral mucosa,   RESPIRATORY: Normal respiratory effort; lungs are clear to auscultation bilaterally  CARDIOVASCULAR: Regular rate and rhythm, normal S1 and S2, no murmur   EXTS: No lower extremity edema; Peripheral pulses are 2+ bilaterally  ABDOMEN: Nontender to palpation, normoactive bowel sounds, no rebound/guarding;   MUSCLOSKELETAL: no joint swelling or tenderness to palpation  PSYCH: affect appropriate  NEUROLOGY: A+O to person, place, and time; CN 2-12 are intact and symmetric; no gross sensory deficits;       LABS:                        14.3   7.13  )-----------( 214      ( 19 Nov 2023 05:46 )             42.5     11-19    142  |  104  |  13.7  ----------------------------<  92  3.3<L>   |  28.0  |  1.05    Ca    8.5      19 Nov 2023 05:46  Phos  3.2     11-18  Mg     2.1     11-18    TPro  6.2<L>  /  Alb  3.7  /  TBili  0.7  /  DBili  x   /  AST  17  /  ALT  16  /  AlkPhos  84  11-19      CARDIAC MARKERS ( 18 Nov 2023 04:10 )  x     / x     / 138 U/L / x     / x          Urinalysis Basic - ( 19 Nov 2023 05:46 )    Color: x / Appearance: x / SG: x / pH: x  Gluc: 92 mg/dL / Ketone: x  / Bili: x / Urobili: x   Blood: x / Protein: x / Nitrite: x   Leuk Esterase: x / RBC: x / WBC x   Sq Epi: x / Non Sq Epi: x / Bacteria: x          RADIOLOGY & ADDITIONAL TESTS:  Results Reviewed:      Patient is a 53y old  Male who presents with a chief complaint of Chest Pain/Palpitations 2/2 New Onset AF RVR (19 Nov 2023 09:08)      no acute issues    REVIEW OF SYSTEMS: All systems are reviewed and found to be negative except above    MEDICATIONS  (STANDING):  aspirin  chewable 81 milliGRAM(s) Oral daily  doxazosin 4 milliGRAM(s) Oral daily  influenza   Vaccine 0.5 milliLiter(s) IntraMuscular once  metoprolol succinate ER 50 milliGRAM(s) Oral daily    MEDICATIONS  (PRN):  acetaminophen     Tablet .. 650 milliGRAM(s) Oral every 6 hours PRN Temp greater or equal to 38C (100.4F), Mild Pain (1 - 3)  aluminum hydroxide/magnesium hydroxide/simethicone Suspension 30 milliLiter(s) Oral every 4 hours PRN Dyspepsia  melatonin 3 milliGRAM(s) Oral at bedtime PRN Insomnia  ondansetron Injectable 4 milliGRAM(s) IV Push every 8 hours PRN Nausea and/or Vomiting      CAPILLARY BLOOD GLUCOSE        I&O's Summary      PHYSICAL EXAM:  Vital Signs Last 24 Hrs  T(C): 36.7 (19 Nov 2023 08:41), Max: 36.8 (18 Nov 2023 22:13)  T(F): 98 (19 Nov 2023 08:41), Max: 98.2 (18 Nov 2023 22:13)  HR: 93 (19 Nov 2023 10:35) (84 - 103)  BP: 128/86 (19 Nov 2023 10:35) (128/86 - 161/107)  BP(mean): 116 (18 Nov 2023 16:46) (116 - 116)  RR: 16 (19 Nov 2023 10:35) (16 - 19)  SpO2: 97% (19 Nov 2023 10:35) (94% - 98%)    Parameters below as of 19 Nov 2023 10:35  Patient On (Oxygen Delivery Method): room air        CONSTITUTIONAL: NAD,  EYES: PERRLA; conjunctiva and sclera clear  ENMT: Moist oral mucosa,   RESPIRATORY: Normal respiratory effort; lungs are clear to auscultation bilaterally  CARDIOVASCULAR: Regular rate and rhythm, normal S1 and S2, no murmur   EXTS: No lower extremity edema; Peripheral pulses are 2+ bilaterally  ABDOMEN: Nontender to palpation, normoactive bowel sounds, no rebound/guarding;   MUSCLOSKELETAL: no joint swelling or tenderness to palpation  PSYCH: affect appropriate  NEUROLOGY: A+O to person, place, and time; CN 2-12 are intact and symmetric; no gross sensory deficits;       LABS:                        14.3   7.13  )-----------( 214      ( 19 Nov 2023 05:46 )             42.5     11-19    142  |  104  |  13.7  ----------------------------<  92  3.3<L>   |  28.0  |  1.05    Ca    8.5      19 Nov 2023 05:46  Phos  3.2     11-18  Mg     2.1     11-18    TPro  6.2<L>  /  Alb  3.7  /  TBili  0.7  /  DBili  x   /  AST  17  /  ALT  16  /  AlkPhos  84  11-19      CARDIAC MARKERS ( 18 Nov 2023 04:10 )  x     / x     / 138 U/L / x     / x          Urinalysis Basic - ( 19 Nov 2023 05:46 )    Color: x / Appearance: x / SG: x / pH: x  Gluc: 92 mg/dL / Ketone: x  / Bili: x / Urobili: x   Blood: x / Protein: x / Nitrite: x   Leuk Esterase: x / RBC: x / WBC x   Sq Epi: x / Non Sq Epi: x / Bacteria: x          RADIOLOGY & ADDITIONAL TESTS:  Results Reviewed:   < from: TTE Echo Complete w/ Contrast w/ Doppler (11.19.23 @ 13:00) >  Summary:   1. Left ventricular ejection fraction, by visual estimation, is 50%.   2. Low normal global left ventricular systolic function.   3. Spectral Doppler shows impaired relaxation pattern of left   ventricular myocardial filling (Grade I diastolic dysfunction).   4. Normal right ventricular size and function.   5. The left atrium is normal in size.   6. The right atrium is normal in size.   7. Mild thickening of the anterior and posterior mitral valve leaflets.   8. Trace mitral valve regurgitation.   9. Sclerotic aortic valve with normal opening.  10. Mildly dilated pulmonary artery.  11. There is no evidence of pericardial effusion.  12. Dilatation of the sinuses of Valsalva.  13. Endocardial visualization was enhanced with intravenous echo contrast.    < end of copied text >

## 2023-11-19 NOTE — DISCHARGE NOTE PROVIDER - NSDCMRMEDTOKEN_GEN_ALL_CORE_FT
aspirin 81 mg oral tablet, chewable: 1 tab(s) orally once a day  Cardura 4 mg oral tablet: 1 tab(s) orally once a day  metoprolol succinate 50 mg oral tablet, extended release: 1 tab(s) orally once a day  Procardia XL 90 mg oral tablet, extended release: 1 tab(s) orally once a day

## 2023-11-19 NOTE — DISCHARGE NOTE PROVIDER - NSDCCPCAREPLAN_GEN_ALL_CORE_FT
PRINCIPAL DISCHARGE DIAGNOSIS  Diagnosis: Rapid atrial fibrillation  Assessment and Plan of Treatment:       SECONDARY DISCHARGE DIAGNOSES  Diagnosis: Acute renal failure  Assessment and Plan of Treatment:     Diagnosis: HTN (hypertension)  Assessment and Plan of Treatment:

## 2024-03-05 NOTE — BRIEF OPERATIVE NOTE - SPECIMENS
Right knee bone and soft tissue Medical Necessity Information: It is in your best interest to select a reason for this procedure from the list below. All of these items fulfill various CMS LCD requirements except the new and changing color options. Medical Necessity Clause: This procedure was medically necessary because the lesion that was treated was: Lab: [id number] Lab Facility: [id number] Detail Level: Detailed Was A Bandage Applied: Yes Size Of Lesion In Cm (Required): 0.3 X Size Of Lesion In Cm (Optional): 0 Depth Of Shave: dermis Biopsy Method: Dermablade Anesthesia Type: 1% lidocaine with epinephrine Hemostasis: Drysol Wound Care: Petrolatum Render Path Notes In Note?: No Consent was obtained from the patient. The risks and benefits to therapy were discussed in detail. Specifically, the risks of infection, scarring, bleeding, prolonged wound healing, incomplete removal, allergy to anesthesia, nerve injury and recurrence were addressed. Prior to the procedure, the treatment site was clearly identified and confirmed by the patient. All components of Universal Protocol/PAUSE Rule completed. Post-Care Instructions: I reviewed with the patient in detail post-care instructions. Patient is to keep the biopsy site dry overnight, and then apply bacitracin twice daily until healed. Patient may apply hydrogen peroxide soaks to remove any crusting. Notification Instructions: Patient will be notified of pathology results. However, patient instructed to call the office if not contacted within 2 weeks. Billing Type: Third-Party Bill

## 2024-03-31 ENCOUNTER — EMERGENCY (EMERGENCY)
Facility: HOSPITAL | Age: 54
LOS: 1 days | End: 2024-03-31
Attending: EMERGENCY MEDICINE
Payer: COMMERCIAL

## 2024-03-31 VITALS
TEMPERATURE: 98 F | RESPIRATION RATE: 18 BRPM | WEIGHT: 249.56 LBS | HEART RATE: 106 BPM | DIASTOLIC BLOOD PRESSURE: 99 MMHG | OXYGEN SATURATION: 95 % | SYSTOLIC BLOOD PRESSURE: 146 MMHG

## 2024-03-31 DIAGNOSIS — Z96.659 PRESENCE OF UNSPECIFIED ARTIFICIAL KNEE JOINT: Chronic | ICD-10-CM

## 2024-03-31 DIAGNOSIS — Z98.890 OTHER SPECIFIED POSTPROCEDURAL STATES: Chronic | ICD-10-CM

## 2024-03-31 LAB
ALBUMIN SERPL ELPH-MCNC: 3.9 G/DL — SIGNIFICANT CHANGE UP (ref 3.3–5.2)
ALP SERPL-CCNC: 104 U/L — SIGNIFICANT CHANGE UP (ref 40–120)
ALT FLD-CCNC: 16 U/L — SIGNIFICANT CHANGE UP
ANION GAP SERPL CALC-SCNC: 11 MMOL/L — SIGNIFICANT CHANGE UP (ref 5–17)
AST SERPL-CCNC: 22 U/L — SIGNIFICANT CHANGE UP
BASOPHILS # BLD AUTO: 0.09 K/UL — SIGNIFICANT CHANGE UP (ref 0–0.2)
BASOPHILS NFR BLD AUTO: 1.2 % — SIGNIFICANT CHANGE UP (ref 0–2)
BILIRUB SERPL-MCNC: 0.6 MG/DL — SIGNIFICANT CHANGE UP (ref 0.4–2)
BUN SERPL-MCNC: 17.3 MG/DL — SIGNIFICANT CHANGE UP (ref 8–20)
CALCIUM SERPL-MCNC: 9 MG/DL — SIGNIFICANT CHANGE UP (ref 8.4–10.5)
CHLORIDE SERPL-SCNC: 103 MMOL/L — SIGNIFICANT CHANGE UP (ref 96–108)
CO2 SERPL-SCNC: 26 MMOL/L — SIGNIFICANT CHANGE UP (ref 22–29)
CREAT SERPL-MCNC: 1.08 MG/DL — SIGNIFICANT CHANGE UP (ref 0.5–1.3)
EGFR: 81 ML/MIN/1.73M2 — SIGNIFICANT CHANGE UP
EOSINOPHIL # BLD AUTO: 0.09 K/UL — SIGNIFICANT CHANGE UP (ref 0–0.5)
EOSINOPHIL NFR BLD AUTO: 1.2 % — SIGNIFICANT CHANGE UP (ref 0–6)
GLUCOSE SERPL-MCNC: 107 MG/DL — HIGH (ref 70–99)
HCT VFR BLD CALC: 42.1 % — SIGNIFICANT CHANGE UP (ref 39–50)
HGB BLD-MCNC: 14 G/DL — SIGNIFICANT CHANGE UP (ref 13–17)
IMM GRANULOCYTES NFR BLD AUTO: 0.3 % — SIGNIFICANT CHANGE UP (ref 0–0.9)
LACTATE BLDV-MCNC: 1.7 MMOL/L — SIGNIFICANT CHANGE UP (ref 0.5–2)
LYMPHOCYTES # BLD AUTO: 1.26 K/UL — SIGNIFICANT CHANGE UP (ref 1–3.3)
LYMPHOCYTES # BLD AUTO: 16.4 % — SIGNIFICANT CHANGE UP (ref 13–44)
MCHC RBC-ENTMCNC: 29.5 PG — SIGNIFICANT CHANGE UP (ref 27–34)
MCHC RBC-ENTMCNC: 33.3 GM/DL — SIGNIFICANT CHANGE UP (ref 32–36)
MCV RBC AUTO: 88.6 FL — SIGNIFICANT CHANGE UP (ref 80–100)
MONOCYTES # BLD AUTO: 0.76 K/UL — SIGNIFICANT CHANGE UP (ref 0–0.9)
MONOCYTES NFR BLD AUTO: 9.9 % — SIGNIFICANT CHANGE UP (ref 2–14)
NEUTROPHILS # BLD AUTO: 5.46 K/UL — SIGNIFICANT CHANGE UP (ref 1.8–7.4)
NEUTROPHILS NFR BLD AUTO: 71 % — SIGNIFICANT CHANGE UP (ref 43–77)
PLATELET # BLD AUTO: 250 K/UL — SIGNIFICANT CHANGE UP (ref 150–400)
POTASSIUM SERPL-MCNC: 3.8 MMOL/L — SIGNIFICANT CHANGE UP (ref 3.5–5.3)
POTASSIUM SERPL-SCNC: 3.8 MMOL/L — SIGNIFICANT CHANGE UP (ref 3.5–5.3)
PROT SERPL-MCNC: 6.9 G/DL — SIGNIFICANT CHANGE UP (ref 6.6–8.7)
RBC # BLD: 4.75 M/UL — SIGNIFICANT CHANGE UP (ref 4.2–5.8)
RBC # FLD: 13.1 % — SIGNIFICANT CHANGE UP (ref 10.3–14.5)
SODIUM SERPL-SCNC: 140 MMOL/L — SIGNIFICANT CHANGE UP (ref 135–145)
WBC # BLD: 7.68 K/UL — SIGNIFICANT CHANGE UP (ref 3.8–10.5)
WBC # FLD AUTO: 7.68 K/UL — SIGNIFICANT CHANGE UP (ref 3.8–10.5)

## 2024-03-31 PROCEDURE — 96374 THER/PROPH/DIAG INJ IV PUSH: CPT

## 2024-03-31 PROCEDURE — 85025 COMPLETE CBC W/AUTO DIFF WBC: CPT

## 2024-03-31 PROCEDURE — 73140 X-RAY EXAM OF FINGER(S): CPT | Mod: 26,LT

## 2024-03-31 PROCEDURE — 80053 COMPREHEN METABOLIC PANEL: CPT

## 2024-03-31 PROCEDURE — 36415 COLL VENOUS BLD VENIPUNCTURE: CPT

## 2024-03-31 PROCEDURE — 99284 EMERGENCY DEPT VISIT MOD MDM: CPT | Mod: 25

## 2024-03-31 PROCEDURE — 87040 BLOOD CULTURE FOR BACTERIA: CPT

## 2024-03-31 PROCEDURE — 83605 ASSAY OF LACTIC ACID: CPT

## 2024-03-31 PROCEDURE — 73140 X-RAY EXAM OF FINGER(S): CPT

## 2024-03-31 PROCEDURE — 99285 EMERGENCY DEPT VISIT HI MDM: CPT

## 2024-03-31 RX ORDER — CEFAZOLIN SODIUM 1 G
2000 VIAL (EA) INJECTION ONCE
Refills: 0 | Status: COMPLETED | OUTPATIENT
Start: 2024-03-31 | End: 2024-03-31

## 2024-03-31 RX ORDER — CEFAZOLIN SODIUM 1 G
1000 VIAL (EA) INJECTION ONCE
Refills: 0 | Status: DISCONTINUED | OUTPATIENT
Start: 2024-03-31 | End: 2024-03-31

## 2024-03-31 RX ORDER — CEPHALEXIN 500 MG
1 CAPSULE ORAL
Qty: 14 | Refills: 0
Start: 2024-03-31 | End: 2024-04-06

## 2024-03-31 RX ADMIN — Medication 2000 MILLIGRAM(S): at 17:25

## 2024-03-31 NOTE — ED STATDOCS - CARE PROVIDER_API CALL
Vicente Mcbride  Plastic Surgery  [address] [place] [id number]  Phone: [phone]  Fax: [phone]  Follow Up Time:

## 2024-03-31 NOTE — CONSULT NOTE ADULT - SUBJECTIVE AND OBJECTIVE BOX
Pt Name: GURJIT SERRA    MRN: [id number]      Patient is a 53y Male PMHX HTN presents to the ED with Left 3rd digit mass x 1 week. Patient reports 3 months ago wart like lesion was present on finger tip, Treated with over the counter topical wart medication. Patient reports treatment work, entire lesion fell off. It created a .5cm void on the finger tip which continued to bleed. Patient endorses keeping the wound clean, and dressing it appropriately. Skin healed about 1 month ago and bleeding resolved, with "blood bubble" present. This week the growth doubled in size with no skin present. Patient denies fevers, chills, motor sensory changes. Otherwise painless. No trauma.           PAST MEDICAL & SURGICAL HISTORY:  PAST MEDICAL & SURGICAL HISTORY:  Hypertension      Risk factors for obstructive sleep apnea      S/P right knee arthroscopy  2007      S/P knee replacement  Right          Allergies: No Known Allergies      Medications:     FAMILY HISTORY:  Family history of CVA (Mother)    : non-contributory    Social History:                  14.0   7.68  )-----------( 250      ( 31 Mar 2024 16:50 )             42.1       03-31    140  |  103  |  17.3  ----------------------------<  107<H>  3.8   |  26.0  |  1.08    Ca    9.0      31 Mar 2024 16:50    TPro  6.9  /  Alb  3.9  /  TBili  0.6  /  DBili  x   /  AST  22  /  ALT  16  /  AlkPhos  104  03-31      Vital Signs Last 24 Hrs  T(C): 36.6 (31 Mar 2024 14:51), Max: 36.6 (31 Mar 2024 14:51)  T(F): 97.8 (31 Mar 2024 14:51), Max: 97.8 (31 Mar 2024 14:51)  HR: 106 (31 Mar 2024 14:51) (106 - 106)  BP: 146/99 (31 Mar 2024 14:51) (146/99 - 146/99)  BP(mean): --  RR: 18 (31 Mar 2024 14:51) (18 - 18)  SpO2: 95% (31 Mar 2024 14:51) (95% - 95%)    Parameters below as of 31 Mar 2024 14:51  Patient On (Oxygen Delivery Method): room air        PHYSICAL EXAM:      Appearance: Alert, responsive, in no acute distress.    Neurological: Sensation is grossly intact to light touch.     Vascular: 2+ Radial pulses. Cap refill < 2 sec. No extremity ulcerations. No cyanosis.    Musculoskeletal:         Left Upper Extremity:  1 cm in diameter mass on distal aspect of left 3rd digit. No active drainage, no bleeding, no erythema present, minimal swelling, no streaking. Active ROM of digits. DIP PIP flexion extension intact. No bony tenderness. Compartments soft, compressible, nontender. Denies Numbness, tingling, other orthopedic complaints.      Imaging Studies:  Left hand xray no fracture present on prelim read      A/P:  Pt is a  53y Male found to have Left hand 3rd digit mass distal tip    PLAN:   * D/w Dr. Mcbride  * Soft dressing to be applied to ED  * Follow-up with Dr. Mcbride this week, likely to be excised and sent for biopsy

## 2024-03-31 NOTE — ED STATDOCS - PHYSICAL EXAMINATION
VITAL SIGNS: I have reviewed nursing notes and confirm.  CONSTITUTIONAL:  in no acute distress.  SKIN: Skin exam is warm and dry, no acute rash.  HEAD: Normocephalic; atraumatic.  EYES: PERRL, EOM intact; conjunctiva and sclera clear.  ENT: No nasal discharge; airway clear. Throat clear.  NECK: Supple; non tender.    CARD: Regular rate and rhythm.  RESP: No wheezes,  no rales or rhonchi.   ABD:  soft; non-distended; non-tender;   EXT: Normal ROM. (+) 1 cm soft tissue mass at the tip of the left 3rd digit with small amount of white discoloration, full ROM at the joints, no erythema spreading to the rest of the finger   NEURO: Alert, oriented. Grossly unremarkable. No focal deficits.  moves all extremities,  normal gait   PSYCH: Cooperative, appropriate.

## 2024-03-31 NOTE — ED ADULT TRIAGE NOTE - CHIEF COMPLAINT QUOTE
sent by UC for "infected mass of left 3rd digit"; pt states had "wart in January used compound W got it out but hasn't healed right". noticed " blood blister 1 week ago and now I think the bone is showing" .

## 2024-03-31 NOTE — ED STATDOCS - NS ED ROS FT
CONSTITUTIONAL - no  fever, no diaphoresis, no weight change  SKIN - no rash  HEMATOLOGIC - no bleeding, no bruising  EYES - no eye pain, no blurred vision  ENT - no change in hearing, no pain  RESPIRATORY - no shortness of breath, no cough  CARDIAC - no chest pain, no palpitations  GI - no abd pain, no nausea, no vomiting, no diarrhea, no constipation, no bleeding  GENITO-URINARY - no discharge, no dysuria; no hematuria,   ENDO - no polydipsia, no polyuria, no heat/no cold intolerance  MUSCULOSKELETAL - (+) Left 3rd finger wound  NEUROLOGIC - no weakness, no headache, no anesthesia, no paresthesias  PSYCH - no anxiety, non suicidal, non homicidal, no hallucination, no depression

## 2024-03-31 NOTE — ED STATDOCS - CLINICAL SUMMARY MEDICAL DECISION MAKING FREE TEXT BOX
53-year-old male history of A-fib on aspirin presents with  a possible infected mass at the tip of the left third digit.  Will get blood work, blood culture and lactate, will give Ancef for antibiotic coverage.  Will get x-ray to see bony involvement.  Will need hand consult. 53-year-old male history of A-fib on aspirin presents with  a possible infected mass at the tip of the left third digit.  Will get blood work, blood culture and lactate, will give Ancef for antibiotic coverage.  Will get x-ray to see bony involvement.  Will need hand consult.      Pt seen by ortho, recommending soft dressing and f/u with hand in 1 week for excision and biopsy, Pt reassessed, pt feeling better at this time, vss, pt able to walk, talk and vocalized plan of action. Discussed in depth and explained to pt in depth the next steps that need to be taking including proper follow up with PCP or specialists. All incidental findings were discussed with pt as well. Pt verbalized their concerns and all questions were answered. Pt understands dispo and wants discharge. Given good instructions when to return to ED and importance of f/u.

## 2024-03-31 NOTE — ED STATDOCS - OBJECTIVE STATEMENT
53-year-old male history of A-fib on aspirin presents with infected wound to the left third digit.  Patient reports symptoms started in January when he had a pimple  at the tip of the left third digit.  Patient that it was a blister then it developed into a corne.  Patient went to the pharmacy and recommend to use Compound W.  Patient then report the corne  fell off leaving a small hole at the tip of the finger.  Over the past week patient noticed a growth to the top of his finger.  Patient went to urgent care and sent in for IV antibiotics concerning for infected mass.   Patient denies any fever.

## 2024-03-31 NOTE — ED STATDOCS - PATIENT PORTAL LINK FT
You can access the FollowMyHealth Patient Portal offered by [facility] by registering at the following website: [email or web address]. By joining [employer]’s FollowMyHealth portal, you will also be able to view your health information using other applications (apps) compatible with our system.

## 2024-03-31 NOTE — ED STATDOCS - ATTENDING APP SHARED VISIT CONTRIBUTION OF CARE
I, Kash Jimenes, performed the initial face to face bedside interview with this patient regarding history of present illness, review of symptoms and relevant past medical, social and family history.  I completed an independent physical examination.  I was the initial provider who evaluated this patient. I have signed out the follow up of any pending tests (i.e. labs, radiological studies) to the [facility].  I have communicated the patient’s plan of care and disposition with the [facility].

## 2024-04-05 LAB
CULTURE RESULTS: SIGNIFICANT CHANGE UP
CULTURE RESULTS: SIGNIFICANT CHANGE UP
SPECIMEN SOURCE: SIGNIFICANT CHANGE UP
SPECIMEN SOURCE: SIGNIFICANT CHANGE UP

## 2024-04-28 NOTE — BRIEF OPERATIVE NOTE - NSICDXBRIEFOPLAUNCH_GEN_ALL_CORE
Review of Systems   All other systems reviewed and are negative.    Objective:     Vital Signs (Most Recent):  Temp: 97.7 °F (36.5 °C) (04/28/24 0302)  Pulse: 96 (04/28/24 0602)  Resp: (!) 25 (04/28/24 0602)  BP: 121/78 (04/28/24 0602)  SpO2: 96 % (04/28/24 0602) Vital Signs (24h Range):  Temp:  [97.7 °F (36.5 °C)-98.2 °F (36.8 °C)] 97.7 °F (36.5 °C)  Pulse:  [] 96  Resp:  [20-39] 25  SpO2:  [87 %-100 %] 96 %  BP: (111-161)/() 121/78     Weight: 90.2 kg (198 lb 13.7 oz)  Body mass index is 32.1 kg/m².    Intake/Output Summary (Last 24 hours) at 4/28/2024 0809  Last data filed at 4/28/2024 0300  Gross per 24 hour   Intake 1919.4 ml   Output --   Net 1919.4 ml         Physical Exam  Constitutional:       General: He is not in acute distress.     Appearance: Normal appearance. He is obese.   Cardiovascular:      Rate and Rhythm: Regular rhythm. Tachycardia present.      Heart sounds: No murmur heard.  Pulmonary:      Effort: Pulmonary effort is normal. No respiratory distress.      Breath sounds: Normal breath sounds. No wheezing.   Abdominal:      General: There is no distension.      Palpations: Abdomen is soft.      Tenderness: There is no abdominal tenderness.             Significant Labs: All pertinent labs within the past 24 hours have been reviewed.  Recent Lab Results  (Last 5 results in the past 24 hours)        04/28/24  0613   04/28/24  0340   04/27/24  2336   04/27/24  2120   04/27/24  1521        Anion Gap   8       7       Baso #   0.03             Basophil %   0.3             BUN   35       46       Calcium   9.4       9.4       Chloride   113       114       CO2   27       28       Creatinine   1.2       1.3       Differential Method   Automated             eGFR   >60       55       Eos #   0.4             Eos %   4.1             Glucose   132       205       Gran # (ANC)   6.8             Gran %   64.2             Hematocrit   40.6             Hemoglobin   13.0             Immature Grans  (Abs)   0.06  Comment: Mild elevation in immature granulocytes is non specific and   can be seen in a variety of conditions including stress response,   acute inflammation, trauma and pregnancy. Correlation with other   laboratory and clinical findings is essential.               Immature Granulocytes   0.6             Lymph #   2.7             Lymph %   25.3             Magnesium    1.7       1.8       MCH   28.6             MCHC   32.0             MCV   89             Mono #   0.6             Mono %   5.5             MPV   9.9             nRBC   0             Phosphorus Level   2.5       2.5       Platelet Count   233             POCT Glucose 137     135   146         Potassium   4.0       3.7       RBC   4.55             RDW   14.1             Sodium   148       149       Troponin I         0.075  Comment: The reference interval for Troponin I represents the 99th percentile   cutoff   for our facility and is consistent with 3rd generation assay   performance.         WBC   10.66                                    Significant Imaging: I have reviewed all pertinent imaging results/findings within the past 24 hours.    X-Ray Chest 1 View   Final Result      No acute abnormality.         Electronically signed by: Yusuf Nicholas   Date:    04/27/2024   Time:    10:45      CT Head Without Contrast   Final Result      No acute intracranial CT abnormality.      All CT scans at this facility are performed  using dose modulation techniques as appropriate to performed exam including the following:  automated exposure control; adjustment of mA and/or kV according to the patients size (this includes techniques or standardized protocols for targeted exams where dose is matched to indication/reason for exam: i.e. extremities or head);  iterative reconstruction technique.         Electronically signed by: Yusuf Nicholas   Date:    04/26/2024   Time:    09:07      X-Ray Chest AP Portable   Final Result      No acute abnormality.          Electronically signed by: Yusuf Nicholas   Date:    04/26/2024   Time:    08:15           <--- Click to Launch ICDx for PreOp, PostOp and Procedure

## 2024-08-05 NOTE — ED ADULT NURSE NOTE - RESPIRATORY ASSESSMENT
Problem: Pain - Adult  Goal: Verbalizes/displays adequate comfort level or baseline comfort level  Outcome: Progressing     Problem: Safety - Adult  Goal: Free from fall injury  Outcome: Progressing     Problem: Discharge Planning  Goal: Discharge to home or other facility with appropriate resources  Outcome: Progressing     Problem: Chronic Conditions and Co-morbidities  Goal: Patient's chronic conditions and co-morbidity symptoms are monitored and maintained or improved  Outcome: Progressing     Problem: Diabetes  Goal: Achieve decreasing blood glucose levels by end of shift  Outcome: Progressing  Goal: Increase stability of blood glucose readings by end of shift  Outcome: Progressing  Goal: Decrease in ketones present in urine by end of shift  Outcome: Progressing  Goal: Maintain electrolyte levels within acceptable range throughout shift  Outcome: Progressing  Goal: Maintain glucose levels >70mg/dl to <250mg/dl throughout shift  Outcome: Progressing  Goal: No changes in neurological exam by end of shift  Outcome: Progressing  Goal: Learn about and adhere to nutrition recommendations by end of shift  Outcome: Progressing  Goal: Vital signs within normal range for age by end of shift  Outcome: Progressing  Goal: Increase self care and/or family involovement by end of shift  Outcome: Progressing  Goal: Receive DSME education by end of shift  Outcome: Progressing     Problem: Pain  Goal: Takes deep breaths with improved pain control throughout the shift  Outcome: Progressing  Goal: Turns in bed with improved pain control throughout the shift  Outcome: Progressing  Goal: Walks with improved pain control throughout the shift  Outcome: Progressing  Goal: Performs ADL's with improved pain control throughout shift  Outcome: Progressing  Goal: Participates in PT with improved pain control throughout the shift  Outcome: Progressing  Goal: Free from opioid side effects throughout the shift  Outcome: Progressing  Goal:  Free from acute confusion related to pain meds throughout the shift  Outcome: Progressing     Problem: Fall/Injury  Goal: Not fall by end of shift  Outcome: Progressing  Goal: Be free from injury by end of the shift  Outcome: Progressing  Goal: Verbalize understanding of personal risk factors for fall in the hospital  Outcome: Progressing  Goal: Verbalize understanding of risk factor reduction measures to prevent injury from fall in the home  Outcome: Progressing  Goal: Use assistive devices by end of the shift  Outcome: Progressing  Goal: Pace activities to prevent fatigue by end of the shift  Outcome: Progressing      - - -

## 2025-04-08 NOTE — ADDENDUM
[FreeTextEntry1] : I, Colin Cardenas, acted solely as a scribe for Dr. Jerry Espinosa on 04/24/2019.\par \par All medical record entries made by the scribe were at my, Dr. Jerry Espinosa, direction and personally dictated by me on 04/24/2019. I have reviewed the chart and agree that the record accurately reflects my personal performance of the history, physical exam, assessment and plan. I have also personally directed, reviewed, and agreed with the chart. 
Yes